# Patient Record
Sex: MALE | Race: BLACK OR AFRICAN AMERICAN | NOT HISPANIC OR LATINO | Employment: STUDENT | URBAN - METROPOLITAN AREA
[De-identification: names, ages, dates, MRNs, and addresses within clinical notes are randomized per-mention and may not be internally consistent; named-entity substitution may affect disease eponyms.]

---

## 2022-02-03 ENCOUNTER — OFFICE VISIT (OUTPATIENT)
Dept: ORTHOPEDICS | Facility: CLINIC | Age: 32
End: 2022-02-03
Payer: MEDICAID

## 2022-02-03 ENCOUNTER — HOSPITAL ENCOUNTER (OUTPATIENT)
Dept: RADIOLOGY | Facility: HOSPITAL | Age: 32
Discharge: HOME | End: 2022-02-03
Attending: PHYSICIAN ASSISTANT
Payer: MEDICAID

## 2022-02-03 ENCOUNTER — TELEPHONE (OUTPATIENT)
Dept: ORTHOPEDICS | Facility: CLINIC | Age: 32
End: 2022-02-03
Payer: MEDICAID

## 2022-02-03 VITALS
BODY MASS INDEX: 22.5 KG/M2 | RESPIRATION RATE: 16 BRPM | DIASTOLIC BLOOD PRESSURE: 74 MMHG | WEIGHT: 160.69 LBS | HEART RATE: 72 BPM | SYSTOLIC BLOOD PRESSURE: 112 MMHG | HEIGHT: 71 IN | OXYGEN SATURATION: 100 %

## 2022-02-03 DIAGNOSIS — S86.001A INJURY OF RIGHT ACHILLES TENDON, INITIAL ENCOUNTER: ICD-10-CM

## 2022-02-03 DIAGNOSIS — M25.571 ACUTE RIGHT ANKLE PAIN: ICD-10-CM

## 2022-02-03 DIAGNOSIS — M25.571 ACUTE RIGHT ANKLE PAIN: Primary | ICD-10-CM

## 2022-02-03 DIAGNOSIS — M76.60 ACHILLES TENDON PAIN: Primary | ICD-10-CM

## 2022-02-03 PROCEDURE — 99203 OFFICE O/P NEW LOW 30 MIN: CPT | Mod: S$PBB,,, | Performed by: PHYSICIAN ASSISTANT

## 2022-02-03 PROCEDURE — 3078F PR MOST RECENT DIASTOLIC BLOOD PRESSURE < 80 MM HG: ICD-10-PCS | Mod: CPTII,S$PBB,, | Performed by: PHYSICIAN ASSISTANT

## 2022-02-03 PROCEDURE — 99999 PR PBB SHADOW E&M-NEW PATIENT-LVL III: CPT | Mod: PBBFAC,,, | Performed by: PHYSICIAN ASSISTANT

## 2022-02-03 PROCEDURE — 99203 PR OFFICE/OUTPT VISIT, NEW, LEVL III, 30-44 MIN: ICD-10-PCS | Mod: S$PBB,,, | Performed by: PHYSICIAN ASSISTANT

## 2022-02-03 PROCEDURE — 73610 X-RAY EXAM OF ANKLE: CPT | Mod: 26,RT,, | Performed by: RADIOLOGY

## 2022-02-03 PROCEDURE — 73610 X-RAY EXAM OF ANKLE: CPT | Mod: TC,RT

## 2022-02-03 PROCEDURE — 99203 OFFICE O/P NEW LOW 30 MIN: CPT | Mod: PBBFAC | Performed by: PHYSICIAN ASSISTANT

## 2022-02-03 PROCEDURE — 3008F PR BODY MASS INDEX (BMI) DOCUMENTED: ICD-10-PCS | Mod: CPTII,S$PBB,, | Performed by: PHYSICIAN ASSISTANT

## 2022-02-03 PROCEDURE — 73610 XR ANKLE COMPLETE 3 VIEW RIGHT: ICD-10-PCS | Mod: 26,RT,, | Performed by: RADIOLOGY

## 2022-02-03 PROCEDURE — 3078F DIAST BP <80 MM HG: CPT | Mod: CPTII,S$PBB,, | Performed by: PHYSICIAN ASSISTANT

## 2022-02-03 PROCEDURE — 3008F BODY MASS INDEX DOCD: CPT | Mod: CPTII,S$PBB,, | Performed by: PHYSICIAN ASSISTANT

## 2022-02-03 PROCEDURE — 3074F SYST BP LT 130 MM HG: CPT | Mod: CPTII,S$PBB,, | Performed by: PHYSICIAN ASSISTANT

## 2022-02-03 PROCEDURE — 3074F PR MOST RECENT SYSTOLIC BLOOD PRESSURE < 130 MM HG: ICD-10-PCS | Mod: CPTII,S$PBB,, | Performed by: PHYSICIAN ASSISTANT

## 2022-02-03 PROCEDURE — 99999 PR PBB SHADOW E&M-NEW PATIENT-LVL III: ICD-10-PCS | Mod: PBBFAC,,, | Performed by: PHYSICIAN ASSISTANT

## 2022-02-03 NOTE — PROGRESS NOTES
Subjective:      Patient ID: Jose A Barber is a 31 y.o. male.    Chief Complaint: Pain, Swelling, and Injury of the Right Ankle    Review of patient's allergies indicates:  No Known Allergies   32 yo M presents to clinic with c/o right achilles pain.  Started 2 days ago while he was playing basketball, lunged and felt a pop to posterior ankle.  Sharp pain and swelling to area since.  Worse with ankle movement and better with rest.  Has been icing and elevating with little improvement.  No calf pain/swelling.  No numbness/tingling.        Review of Systems   Constitutional: Negative for chills, diaphoresis and fever.   HENT: Negative for congestion, ear discharge and ear pain.    Eyes: Negative for blurred vision, discharge, double vision and pain.   Cardiovascular: Negative for chest pain, claudication and cyanosis.   Respiratory: Negative for cough, hemoptysis and shortness of breath.    Endocrine: Negative for cold intolerance and heat intolerance.   Skin: Negative for color change, dry skin, itching and rash.   Musculoskeletal: Positive for joint pain and joint swelling. Negative for arthritis, back pain, falls, gout, muscle weakness and neck pain.   Gastrointestinal: Negative for abdominal pain and change in bowel habit.   Neurological: Negative for brief paralysis, disturbances in coordination, dizziness, numbness and paresthesias.   Psychiatric/Behavioral: Negative for altered mental status and depression.         Objective:          General    Constitutional: He is oriented to person, place, and time. He appears well-developed and well-nourished. No distress.   HENT:   Head: Atraumatic.   Eyes: EOM are normal. Right eye exhibits no discharge. Left eye exhibits no discharge.   Cardiovascular: Normal rate.    Pulmonary/Chest: Effort normal. No respiratory distress.   Abdominal: Soft.   Neurological: He is alert and oriented to person, place, and time.   Psychiatric: He has a normal mood and affect. His behavior  is normal.         Right Ankle/Foot Exam     Inspection   Deformity: absent  Erythema: absent  Bruising: Ankle - absent Foot - absent    Swelling   The patient is swollen on the Achilles tendon.    Tenderness   The patient is tender to palpation of the Achilles tendon.    Pain   The patient exhibits pain of the Achilles insertion and Achilles tendon.    Range of Motion   Ankle Joint   Dorsiflexion: normal   Plantar flexion: normal   Subtalar Joint   Inversion: abnormal   Eversion: abnormal   Mata Test:  positive  First MTP Joint: normal    Tests   Heel Walk: unable to perform  Tiptoe Walk: unable to perform  Single Heel Rise: unable to perform    Other   Sensation: normal    Comments:  Difficult exam due to patient discomfort    Left Ankle/Foot Exam     Inspection  Deformity: absent  Erythema: absent  Bruising: Ankle - absent Foot - absent    Range of Motion   Ankle Joint  Dorsiflexion: normal   Plantar flexion: normal     Subtalar Joint   Inversion: normal   Eversion: normal   Mata Test:  normal  First MTP Joint: normal    Tests   Heel Walk: able to perform  Tiptoe Walk: able to perform  Single Heel Rise: able to perform    Other   Sensation: normal      Vascular Exam     Right Pulses  Dorsalis Pedis:      2+          Left Pulses  Dorsalis Pedis:      2+          Edema  Right Lower Leg: absent  Left Lower Leg: absent              Assessment:         Xray Right Ankle 2/3/22:  No acute radiographic findings of the right ankle.      Encounter Diagnoses   Name Primary?    Acute right ankle pain     Achilles tendon pain Yes    Injury of right Achilles tendon, initial encounter     Achilles tendon pain  -     MRI Ankle Without Contrast Right; Future; Expected date: 02/03/2022    Acute right ankle pain    Injury of right Achilles tendon, initial encounter  -     MRI Ankle Without Contrast Right; Future; Expected date: 02/03/2022               Plan:         I made the decision to obtain old records of the  patient including previous notes and imaging. New imaging was ordered today of the extremity or extremities evaluated.     We discussed treatment options. Patient would like to proceed with MRI at this time. He would also like to try OTC ibuprofen.    1. MRI of right ankle to assess for achilles tendon rupture.  2. Ibuprofen as directed as needed.  3. Discontinue activity/sports until results.   4. Ice compress to the affected area 2-3x a day for 15-20 minutes as needed for pain management.  5. 62570 - I performed a custom orthotic / brace adjustment, fitting and training with the patient. The patient demonstrated understanding and proper care. This was performed for 15 minutes. Pt fitted with CAM boot.  6. RTC for MRI results and follow-up, sooner if needed.      Patient voices understanding of and agreement with treatment plan. All of the patient's questions were answered and the patient will contact us if he has any questions or concerns in the interim.

## 2022-02-03 NOTE — TELEPHONE ENCOUNTER
----- Message from Mira Gordon sent at 2/3/2022  2:24 PM CST -----  Regarding: Patient Advice  Contact: [phone] Pt  Patient has an appointment at 4 today for possible achilles tear. Please call if xray is needed. Please call. [phone]

## 2022-02-04 ENCOUNTER — HOSPITAL ENCOUNTER (OUTPATIENT)
Dept: RADIOLOGY | Facility: HOSPITAL | Age: 32
Discharge: HOME | End: 2022-02-04
Attending: PHYSICIAN ASSISTANT
Payer: MEDICAID

## 2022-02-04 ENCOUNTER — OFFICE VISIT (OUTPATIENT)
Dept: ORTHOPEDICS | Facility: CLINIC | Age: 32
End: 2022-02-04
Payer: MEDICAID

## 2022-02-04 ENCOUNTER — TELEPHONE (OUTPATIENT)
Dept: SPORTS MEDICINE | Facility: CLINIC | Age: 32
End: 2022-02-04
Payer: MEDICAID

## 2022-02-04 VITALS
HEART RATE: 86 BPM | HEIGHT: 71 IN | RESPIRATION RATE: 18 BRPM | SYSTOLIC BLOOD PRESSURE: 118 MMHG | OXYGEN SATURATION: 99 % | WEIGHT: 159.81 LBS | DIASTOLIC BLOOD PRESSURE: 76 MMHG | BODY MASS INDEX: 22.37 KG/M2

## 2022-02-04 DIAGNOSIS — S86.001A INJURY OF RIGHT ACHILLES TENDON, INITIAL ENCOUNTER: ICD-10-CM

## 2022-02-04 DIAGNOSIS — M76.60 ACHILLES TENDON PAIN: ICD-10-CM

## 2022-02-04 DIAGNOSIS — S86.011D RUPTURE OF RIGHT ACHILLES TENDON, SUBSEQUENT ENCOUNTER: Primary | ICD-10-CM

## 2022-02-04 PROCEDURE — 73721 MRI JNT OF LWR EXTRE W/O DYE: CPT | Mod: TC,RT

## 2022-02-04 PROCEDURE — 1160F RVW MEDS BY RX/DR IN RCRD: CPT | Mod: CPTII,,, | Performed by: PHYSICIAN ASSISTANT

## 2022-02-04 PROCEDURE — 1159F PR MEDICATION LIST DOCUMENTED IN MEDICAL RECORD: ICD-10-PCS | Mod: CPTII,,, | Performed by: PHYSICIAN ASSISTANT

## 2022-02-04 PROCEDURE — 99999 PR PBB SHADOW E&M-EST. PATIENT-LVL III: CPT | Mod: PBBFAC,,, | Performed by: PHYSICIAN ASSISTANT

## 2022-02-04 PROCEDURE — 99213 PR OFFICE/OUTPT VISIT, EST, LEVL III, 20-29 MIN: ICD-10-PCS | Mod: S$PBB,,, | Performed by: PHYSICIAN ASSISTANT

## 2022-02-04 PROCEDURE — 3078F PR MOST RECENT DIASTOLIC BLOOD PRESSURE < 80 MM HG: ICD-10-PCS | Mod: CPTII,,, | Performed by: PHYSICIAN ASSISTANT

## 2022-02-04 PROCEDURE — 99999 PR PBB SHADOW E&M-EST. PATIENT-LVL III: ICD-10-PCS | Mod: PBBFAC,,, | Performed by: PHYSICIAN ASSISTANT

## 2022-02-04 PROCEDURE — 3008F BODY MASS INDEX DOCD: CPT | Mod: CPTII,,, | Performed by: PHYSICIAN ASSISTANT

## 2022-02-04 PROCEDURE — 1160F PR REVIEW ALL MEDS BY PRESCRIBER/CLIN PHARMACIST DOCUMENTED: ICD-10-PCS | Mod: CPTII,,, | Performed by: PHYSICIAN ASSISTANT

## 2022-02-04 PROCEDURE — 3074F SYST BP LT 130 MM HG: CPT | Mod: CPTII,,, | Performed by: PHYSICIAN ASSISTANT

## 2022-02-04 PROCEDURE — 3008F PR BODY MASS INDEX (BMI) DOCUMENTED: ICD-10-PCS | Mod: CPTII,,, | Performed by: PHYSICIAN ASSISTANT

## 2022-02-04 PROCEDURE — 99213 OFFICE O/P EST LOW 20 MIN: CPT | Mod: PBBFAC,25 | Performed by: PHYSICIAN ASSISTANT

## 2022-02-04 PROCEDURE — 99213 OFFICE O/P EST LOW 20 MIN: CPT | Mod: S$PBB,,, | Performed by: PHYSICIAN ASSISTANT

## 2022-02-04 PROCEDURE — 1159F MED LIST DOCD IN RCRD: CPT | Mod: CPTII,,, | Performed by: PHYSICIAN ASSISTANT

## 2022-02-04 PROCEDURE — 3074F PR MOST RECENT SYSTOLIC BLOOD PRESSURE < 130 MM HG: ICD-10-PCS | Mod: CPTII,,, | Performed by: PHYSICIAN ASSISTANT

## 2022-02-04 PROCEDURE — 3078F DIAST BP <80 MM HG: CPT | Mod: CPTII,,, | Performed by: PHYSICIAN ASSISTANT

## 2022-02-04 PROCEDURE — 73721 MRI JNT OF LWR EXTRE W/O DYE: CPT | Mod: 26,RT,, | Performed by: RADIOLOGY

## 2022-02-04 PROCEDURE — 73721 MRI ANKLE WITHOUT CONTRAST RIGHT: ICD-10-PCS | Mod: 26,RT,, | Performed by: RADIOLOGY

## 2022-02-04 NOTE — PROGRESS NOTES
Subjective:      Patient ID: Jose A Barber is a 31 y.o. male.    Chief Complaint: Pain of the Right Ankle    Review of patient's allergies indicates:  No Known Allergies   Pt returns to clinic for follow up and MRI results.  No change in sx.  MRI this morning showed complete Achilles tendon tear.    2/3/22:  30 yo M presents to clinic with c/o right achilles pain.  Started 2 days ago while he was playing basketball, lunged and felt a pop to posterior ankle.  Sharp pain and swelling to area since.  Worse with ankle movement and better with rest.  Has been icing and elevating with little improvement.  No calf pain/swelling.  No numbness/tingling.      Pain  Associated symptoms include joint swelling. Pertinent negatives include no abdominal pain, change in bowel habit, chest pain, chills, congestion, coughing, diaphoresis, fever, neck pain, numbness or rash.       Review of Systems   Constitutional: Negative for chills, diaphoresis and fever.   HENT: Negative for congestion, ear discharge and ear pain.    Eyes: Negative for blurred vision, discharge, double vision and pain.   Cardiovascular: Negative for chest pain, claudication and cyanosis.   Respiratory: Negative for cough, hemoptysis and shortness of breath.    Endocrine: Negative for cold intolerance and heat intolerance.   Skin: Negative for color change, dry skin, itching and rash.   Musculoskeletal: Positive for joint pain and joint swelling. Negative for arthritis, back pain, falls, gout, muscle weakness and neck pain.   Gastrointestinal: Negative for abdominal pain and change in bowel habit.   Neurological: Negative for brief paralysis, disturbances in coordination, dizziness, numbness and paresthesias.   Psychiatric/Behavioral: Negative for altered mental status and depression.         Objective:          General    Constitutional: He is oriented to person, place, and time. He appears well-developed and well-nourished. No distress.   HENT:   Head:  Atraumatic.   Eyes: EOM are normal. Right eye exhibits no discharge. Left eye exhibits no discharge.   Cardiovascular: Normal rate.    Pulmonary/Chest: Effort normal. No respiratory distress.   Abdominal: Soft.   Neurological: He is alert and oriented to person, place, and time.   Psychiatric: He has a normal mood and affect. His behavior is normal.         Right Ankle/Foot Exam     Inspection   Deformity: absent  Erythema: absent  Bruising: Ankle - absent Foot - absent    Swelling   The patient is swollen on the Achilles tendon.    Tenderness   The patient is tender to palpation of the Achilles tendon.    Pain   The patient exhibits pain of the Achilles insertion and Achilles tendon.    Range of Motion   Ankle Joint   Dorsiflexion: normal   Plantar flexion: normal   Subtalar Joint   Inversion: abnormal   Eversion: abnormal   Mata Test:  positive  First MTP Joint: normal    Tests   Heel Walk: unable to perform  Tiptoe Walk: unable to perform  Single Heel Rise: unable to perform    Other   Sensation: normal    Comments:  Difficult exam due to patient discomfort    Left Ankle/Foot Exam     Inspection  Deformity: absent  Erythema: absent  Bruising: Ankle - absent Foot - absent    Range of Motion   Ankle Joint  Dorsiflexion: normal   Plantar flexion: normal     Subtalar Joint   Inversion: normal   Eversion: normal   Mata Test:  normal  First MTP Joint: normal    Tests   Heel Walk: able to perform  Tiptoe Walk: able to perform  Single Heel Rise: able to perform    Other   Sensation: normal      Vascular Exam     Right Pulses  Dorsalis Pedis:      2+          Left Pulses  Dorsalis Pedis:      2+          Edema  Right Lower Leg: absent  Left Lower Leg: absent              Assessment:         MRI Right Ankle 2/4/22:  There are abnormal changes involving the Achilles tendon with thickening and adjacent fluid and edema within the soft tissue structures.  There is a complete tear of the tendon approximately 7.4 cm  above the insertion site at the calcaneus. The proximal tendon is retracted and incompletely evaluated.  No evidence of a bone contusion or acute osseous abnormality.    Xray Right Ankle 2/3/22:  No acute radiographic findings of the right ankle.      Encounter Diagnosis   Name Primary?    Rupture of right Achilles tendon, subsequent encounter Yes    Rupture of right Achilles tendon, subsequent encounter  -     Ambulatory referral/consult to Orthopedics; Future; Expected date: 02/11/2022               Plan:         I made the decision to obtain old records of the patient including previous notes and imaging. New imaging was ordered today of the extremity or extremities evaluated.     We discussed treatment options. Patient will follow up with sports medicine to discuss surgical treatment options. He would like to be seen in [place].    1. Referral to sports medicine.  2. Ibuprofen as directed as needed.  3. Ice compress to the affected area 2-3x a day for 15-20 minutes as needed for pain management.  4. CAM boot as directed.  5. RTC as needed.      Patient voices understanding of and agreement with treatment plan. All of the patient's questions were answered and the patient will contact us if he has any questions or concerns in the interim.

## 2022-02-04 NOTE — TELEPHONE ENCOUNTER
----- Message from Maggy Malhotra MA sent at 2/4/2022 10:49 AM CST -----  Regarding: Consult  Please contact pt to schedule appt, referral placed in patients chart. Thank you.

## 2022-02-07 ENCOUNTER — OFFICE VISIT (OUTPATIENT)
Dept: SPORTS MEDICINE | Facility: CLINIC | Age: 32
End: 2022-02-07
Payer: MEDICAID

## 2022-02-07 ENCOUNTER — LAB VISIT (OUTPATIENT)
Dept: FAMILY MEDICINE | Facility: CLINIC | Age: 32
End: 2022-02-07
Payer: MEDICAID

## 2022-02-07 VITALS
HEIGHT: 71 IN | SYSTOLIC BLOOD PRESSURE: 139 MMHG | WEIGHT: 163.13 LBS | DIASTOLIC BLOOD PRESSURE: 79 MMHG | BODY MASS INDEX: 22.84 KG/M2 | HEART RATE: 62 BPM

## 2022-02-07 DIAGNOSIS — Z01.818 PRE-OP TESTING: ICD-10-CM

## 2022-02-07 DIAGNOSIS — S86.011D RUPTURE OF RIGHT ACHILLES TENDON, SUBSEQUENT ENCOUNTER: ICD-10-CM

## 2022-02-07 DIAGNOSIS — Z01.818 PRE-OP TESTING: Primary | ICD-10-CM

## 2022-02-07 DIAGNOSIS — S86.011A RUPTURE OF RIGHT ACHILLES TENDON, INITIAL ENCOUNTER: Primary | ICD-10-CM

## 2022-02-07 PROCEDURE — 99999 PR PBB SHADOW E&M-EST. PATIENT-LVL III: ICD-10-PCS | Mod: PBBFAC,,, | Performed by: STUDENT IN AN ORGANIZED HEALTH CARE EDUCATION/TRAINING PROGRAM

## 2022-02-07 PROCEDURE — 1159F MED LIST DOCD IN RCRD: CPT | Mod: CPTII,,, | Performed by: STUDENT IN AN ORGANIZED HEALTH CARE EDUCATION/TRAINING PROGRAM

## 2022-02-07 PROCEDURE — 1160F PR REVIEW ALL MEDS BY PRESCRIBER/CLIN PHARMACIST DOCUMENTED: ICD-10-PCS | Mod: CPTII,,, | Performed by: STUDENT IN AN ORGANIZED HEALTH CARE EDUCATION/TRAINING PROGRAM

## 2022-02-07 PROCEDURE — U0003 INFECTIOUS AGENT DETECTION BY NUCLEIC ACID (DNA OR RNA); SEVERE ACUTE RESPIRATORY SYNDROME CORONAVIRUS 2 (SARS-COV-2) (CORONAVIRUS DISEASE [COVID-19]), AMPLIFIED PROBE TECHNIQUE, MAKING USE OF HIGH THROUGHPUT TECHNOLOGIES AS DESCRIBED BY CMS-2020-01-R: HCPCS | Performed by: STUDENT IN AN ORGANIZED HEALTH CARE EDUCATION/TRAINING PROGRAM

## 2022-02-07 PROCEDURE — 3075F PR MOST RECENT SYSTOLIC BLOOD PRESS GE 130-139MM HG: ICD-10-PCS | Mod: CPTII,,, | Performed by: STUDENT IN AN ORGANIZED HEALTH CARE EDUCATION/TRAINING PROGRAM

## 2022-02-07 PROCEDURE — 3078F DIAST BP <80 MM HG: CPT | Mod: CPTII,,, | Performed by: STUDENT IN AN ORGANIZED HEALTH CARE EDUCATION/TRAINING PROGRAM

## 2022-02-07 PROCEDURE — 99205 PR OFFICE/OUTPT VISIT, NEW, LEVL V, 60-74 MIN: ICD-10-PCS | Mod: S$PBB,,, | Performed by: STUDENT IN AN ORGANIZED HEALTH CARE EDUCATION/TRAINING PROGRAM

## 2022-02-07 PROCEDURE — 99999 PR PBB SHADOW E&M-EST. PATIENT-LVL III: CPT | Mod: PBBFAC,,, | Performed by: STUDENT IN AN ORGANIZED HEALTH CARE EDUCATION/TRAINING PROGRAM

## 2022-02-07 PROCEDURE — 3008F PR BODY MASS INDEX (BMI) DOCUMENTED: ICD-10-PCS | Mod: CPTII,,, | Performed by: STUDENT IN AN ORGANIZED HEALTH CARE EDUCATION/TRAINING PROGRAM

## 2022-02-07 PROCEDURE — 1160F RVW MEDS BY RX/DR IN RCRD: CPT | Mod: CPTII,,, | Performed by: STUDENT IN AN ORGANIZED HEALTH CARE EDUCATION/TRAINING PROGRAM

## 2022-02-07 PROCEDURE — 3078F PR MOST RECENT DIASTOLIC BLOOD PRESSURE < 80 MM HG: ICD-10-PCS | Mod: CPTII,,, | Performed by: STUDENT IN AN ORGANIZED HEALTH CARE EDUCATION/TRAINING PROGRAM

## 2022-02-07 PROCEDURE — U0005 INFEC AGEN DETEC AMPLI PROBE: HCPCS | Performed by: STUDENT IN AN ORGANIZED HEALTH CARE EDUCATION/TRAINING PROGRAM

## 2022-02-07 PROCEDURE — 3008F BODY MASS INDEX DOCD: CPT | Mod: CPTII,,, | Performed by: STUDENT IN AN ORGANIZED HEALTH CARE EDUCATION/TRAINING PROGRAM

## 2022-02-07 PROCEDURE — 99205 OFFICE O/P NEW HI 60 MIN: CPT | Mod: S$PBB,,, | Performed by: STUDENT IN AN ORGANIZED HEALTH CARE EDUCATION/TRAINING PROGRAM

## 2022-02-07 PROCEDURE — 3075F SYST BP GE 130 - 139MM HG: CPT | Mod: CPTII,,, | Performed by: STUDENT IN AN ORGANIZED HEALTH CARE EDUCATION/TRAINING PROGRAM

## 2022-02-07 PROCEDURE — 1159F PR MEDICATION LIST DOCUMENTED IN MEDICAL RECORD: ICD-10-PCS | Mod: CPTII,,, | Performed by: STUDENT IN AN ORGANIZED HEALTH CARE EDUCATION/TRAINING PROGRAM

## 2022-02-07 PROCEDURE — 99213 OFFICE O/P EST LOW 20 MIN: CPT | Mod: PBBFAC,PN | Performed by: STUDENT IN AN ORGANIZED HEALTH CARE EDUCATION/TRAINING PROGRAM

## 2022-02-07 RX ORDER — PROMETHAZINE HYDROCHLORIDE 25 MG/1
25 TABLET ORAL EVERY 4 HOURS
Qty: 31 TABLET | Refills: 0 | Status: SHIPPED | OUTPATIENT
Start: 2022-02-07

## 2022-02-07 RX ORDER — METHOCARBAMOL 500 MG/1
500 TABLET, FILM COATED ORAL 4 TIMES DAILY
Qty: 120 TABLET | Refills: 0 | Status: SHIPPED | OUTPATIENT
Start: 2022-02-07 | End: 2022-03-11

## 2022-02-07 RX ORDER — CELECOXIB 200 MG/1
200 CAPSULE ORAL 2 TIMES DAILY
Qty: 60 CAPSULE | Refills: 0 | Status: SHIPPED | OUTPATIENT
Start: 2022-02-07 | End: 2022-03-11

## 2022-02-07 RX ORDER — OXYCODONE HYDROCHLORIDE 5 MG/1
5 TABLET ORAL EVERY 4 HOURS PRN
Qty: 31 TABLET | Refills: 0 | Status: SHIPPED | OUTPATIENT
Start: 2022-02-07

## 2022-02-07 RX ORDER — MUPIROCIN 20 MG/G
OINTMENT TOPICAL
Status: CANCELLED | OUTPATIENT
Start: 2022-02-07

## 2022-02-07 RX ORDER — ASPIRIN 81 MG/1
81 TABLET ORAL 2 TIMES DAILY
Qty: 60 TABLET | Refills: 0 | Status: SHIPPED | OUTPATIENT
Start: 2022-02-07 | End: 2022-03-11

## 2022-02-07 NOTE — H&P (VIEW-ONLY)
Subjective:          Chief Complaint: Jose A Barber is a 31 y.o. male who had concerns including Pain of the Right Foot.    Jose A Barber is a 31 y.o. male  presents for evaluation of his right foot Achilles.  He was playing basketball 6 days ago when he felt a pop of his right Achilles.  He was evaluated by Luciana Pool PA-C where x-rays and an MRI were obtained demonstrating an Achilles tendon rupture.  He is here today to discuss treatment options.  He has been mobilizing with crutches and has not been putting weight on his foot.  He says overall his pain is okay, he rates it a 4/10.  He was issued a boot but does not wear when he is out mobilizing as he does not put weight on his foot.  Denies any numbness or paresthesias.  Denies any recent antibiotic or steroid use.    Pain  Pertinent negatives include no joint swelling or myalgias.     Past Medical History:   Diagnosis Date    Hx of vasectomy        No current outpatient medications on file prior to visit.     No current facility-administered medications on file prior to visit.       History reviewed. No pertinent surgical history.    History reviewed. No pertinent family history.    Social History     Socioeconomic History    Marital status: Single       Review of Systems   Constitutional: Negative.   HENT: Negative.    Eyes: Negative.    Cardiovascular: Negative.    Respiratory: Negative.    Endocrine: Negative.    Hematologic/Lymphatic: Negative.    Skin: Negative.    Musculoskeletal: Positive for joint pain (Right ankle) and muscle weakness ( right calf). Negative for arthritis, falls, joint swelling, myalgias and stiffness.   Neurological: Negative.    Psychiatric/Behavioral: Negative.    Allergic/Immunologic: Negative.                    Objective:        General: Jose A is well-developed, well-nourished, appears stated age, in no acute distress, alert and oriented to time, place and person.     General    Nursing note and vitals  reviewed.  Constitutional: He is oriented to person, place, and time. He appears well-developed and well-nourished.   HENT:   Head: Normocephalic and atraumatic.   Nose: Nose normal.   Eyes: EOM are normal.   Cardiovascular: Normal rate and intact distal pulses.    Pulmonary/Chest: Effort normal. No respiratory distress.   Neurological: He is alert and oriented to person, place, and time.   Psychiatric: He has a normal mood and affect. His behavior is normal. Judgment and thought content normal.     General Musculoskeletal Exam   Gait: abnormal     Right Ankle/Foot Exam     Inspection   Scars: absent  Deformity: present (Palpable defect in Achilles tendon several cm proximal the calcaneal insertion)  Erythema: absent  Bruising: Ankle - absent Foot - absent  Effusion: Ankle - absent Foot - absent  Atrophy: Ankle - absent Foot - absent    Range of Motion   Ankle Joint   Dorsiflexion: 10   Plantar flexion: 5   Subtalar Joint   Inversion: 10   Eversion: 20   Mata Test:  positive      Imaging:  MRI of the right ankle from 02/03/2022 personally reviewed by me on 02/07/2022.  There was complete disruption of the Achilles tendon about 7 cm proximal to the insertion in the calcaneus.  No other abnormalities.            Assessment:     Jose A Barber is a 31 y.o. male with right Achilles tendon rupture  Encounter Diagnosis   Name Primary?    Rupture of right Achilles tendon, initial encounter Yes          Plan:         The diagnosis and treatment options were explained at length to the patient all his questions were answered.  I showed him the MRI and I explained the findings to him.  We discussed non operative and operative treatment.  Nonoperative treatment would include time and a stacked boot or cast with gradual progression to mobility and strengthening.  We then discussed operative intervention which would include Achilles tendon repair.  After this discussion, he elected to proceed with Achilles tendon repair.  We  will plan on this on 02/09/2022.  He does not need preoperative clearance.  We will use aspirin for DVT prophylaxis.      The risks, benefits and alternatives to surgery were discussed with the patient at great length.  These include bleeding, infection, vessel/nerve damage, pain, numbness, tingling, complex regional pain syndrome, hardware/surgical failure, need for further surgery, repair failure, tendon retear, cosmetic deformity, failure of repair of other structures, damage to surrounding neurovascular structures, persistent instability, weakness, DVT, PE, arthritis and death.  Patient states an understanding and wishes to proceed with surgery.   All questions were answered.  No guarantees were implied or stated.      All of their questions were answered.  They will call the clinic with any questions or concerns in the interim.    Should the patient's symptoms worsen, persist, or fail to improve they should return for reevaluation and I would be happy to see them back anytime.        Toribio Allan M.D.     Please be aware that this note has been generated with the assistance of [employer] voice-to-text.  Please excuse any spelling or grammatical errors.    Thank you for choosing Dr. Toribio Allan for your sports medicine care. It is our goal to provide you with exceptional care that will help keep you healthy, active, and get you back in the game.     If you felt that you received exemplary care today, please consider leaving feedback for Dr. Allan on [employer] at [email or web address]    Please do not hesitate to reach out to us via email, phone, or MyChart with any questions, concerns, or feedback.

## 2022-02-07 NOTE — H&P
Subjective:          Chief Complaint: Jose A Barber is a 31 y.o. male who had concerns including Pain of the Right Foot.    Jose A Barber is a 31 y.o. male  presents for evaluation of his right foot Achilles.  He was playing basketball 6 days ago when he felt a pop of his right Achilles.  He was evaluated by Luciana Pool PA-C where x-rays and an MRI were obtained demonstrating an Achilles tendon rupture.  He is here today to discuss treatment options.  He has been mobilizing with crutches and has not been putting weight on his foot.  He says overall his pain is okay, he rates it a 4/10.  He was issued a boot but does not wear when he is out mobilizing as he does not put weight on his foot.  Denies any numbness or paresthesias.  Denies any recent antibiotic or steroid use.    Pain  Pertinent negatives include no joint swelling or myalgias.     Past Medical History:   Diagnosis Date    Hx of vasectomy        No current outpatient medications on file prior to visit.     No current facility-administered medications on file prior to visit.       History reviewed. No pertinent surgical history.    History reviewed. No pertinent family history.    Social History     Socioeconomic History    Marital status: Single       Review of Systems   Constitutional: Negative.   HENT: Negative.    Eyes: Negative.    Cardiovascular: Negative.    Respiratory: Negative.    Endocrine: Negative.    Hematologic/Lymphatic: Negative.    Skin: Negative.    Musculoskeletal: Positive for joint pain (Right ankle) and muscle weakness ( right calf). Negative for arthritis, falls, joint swelling, myalgias and stiffness.   Neurological: Negative.    Psychiatric/Behavioral: Negative.    Allergic/Immunologic: Negative.                    Objective:        General: Jose A is well-developed, well-nourished, appears stated age, in no acute distress, alert and oriented to time, place and person.     General    Nursing note and vitals  reviewed.  Constitutional: He is oriented to person, place, and time. He appears well-developed and well-nourished.   HENT:   Head: Normocephalic and atraumatic.   Nose: Nose normal.   Eyes: EOM are normal.   Cardiovascular: Normal rate and intact distal pulses.    Pulmonary/Chest: Effort normal. No respiratory distress.   Neurological: He is alert and oriented to person, place, and time.   Psychiatric: He has a normal mood and affect. His behavior is normal. Judgment and thought content normal.     General Musculoskeletal Exam   Gait: abnormal     Right Ankle/Foot Exam     Inspection   Scars: absent  Deformity: present (Palpable defect in Achilles tendon several cm proximal the calcaneal insertion)  Erythema: absent  Bruising: Ankle - absent Foot - absent  Effusion: Ankle - absent Foot - absent  Atrophy: Ankle - absent Foot - absent    Range of Motion   Ankle Joint   Dorsiflexion: 10   Plantar flexion: 5   Subtalar Joint   Inversion: 10   Eversion: 20   Mata Test:  positive      Imaging:  MRI of the right ankle from 02/03/2022 personally reviewed by me on 02/07/2022.  There was complete disruption of the Achilles tendon about 7 cm proximal to the insertion in the calcaneus.  No other abnormalities.            Assessment:     Jose A Barber is a 31 y.o. male with right Achilles tendon rupture  Encounter Diagnosis   Name Primary?    Rupture of right Achilles tendon, initial encounter Yes          Plan:         The diagnosis and treatment options were explained at length to the patient all his questions were answered.  I showed him the MRI and I explained the findings to him.  We discussed non operative and operative treatment.  Nonoperative treatment would include time and a stacked boot or cast with gradual progression to mobility and strengthening.  We then discussed operative intervention which would include Achilles tendon repair.  After this discussion, he elected to proceed with Achilles tendon repair.  We  will plan on this on 02/09/2022.  He does not need preoperative clearance.  We will use aspirin for DVT prophylaxis.      The risks, benefits and alternatives to surgery were discussed with the patient at great length.  These include bleeding, infection, vessel/nerve damage, pain, numbness, tingling, complex regional pain syndrome, hardware/surgical failure, need for further surgery, repair failure, tendon retear, cosmetic deformity, failure of repair of other structures, damage to surrounding neurovascular structures, persistent instability, weakness, DVT, PE, arthritis and death.  Patient states an understanding and wishes to proceed with surgery.   All questions were answered.  No guarantees were implied or stated.      All of their questions were answered.  They will call the clinic with any questions or concerns in the interim.    Should the patient's symptoms worsen, persist, or fail to improve they should return for reevaluation and I would be happy to see them back anytime.        Toribio Allan M.D.     Please be aware that this note has been generated with the assistance of [employer] voice-to-text.  Please excuse any spelling or grammatical errors.    Thank you for choosing Dr. Toribio Allan for your sports medicine care. It is our goal to provide you with exceptional care that will help keep you healthy, active, and get you back in the game.     If you felt that you received exemplary care today, please consider leaving feedback for Dr. Allan on [email or web address] at [email or web address].    Please do not hesitate to reach out to us via email, phone, or MyChart with any questions, concerns, or feedback.

## 2022-02-07 NOTE — ANESTHESIA PAT ROS NOTE
02/07/2022  Jose A Barber is a 31 y.o., male.      Pre-op Assessment    I have reviewed the Patient Summary Reports.     I have reviewed the Nursing Notes. I have reviewed the NPO Status.   I have reviewed the Medications.     Review of Systems  Anesthesia Hx:  No problems with previous Anesthesia  Denies Family Hx of Anesthesia complications.   Denies Personal Hx of Anesthesia complications.   Social:  Non-Smoker, No Alcohol Use    Hematology/Oncology:  Hematology Normal        EENT/Dental:EENT/Dental Normal   Cardiovascular:   Exercise tolerance: good    Pulmonary:  Pulmonary Normal    Renal/:  Renal/ Normal     Hepatic/GI:  Hepatic/GI Normal    Neurological:  Neurology Normal    Endocrine:  Endocrine Normal    Dermatological:  Skin Normal    Psych:  Psychiatric Normal              Anesthesia Assessment: Preoperative EQUATION    Planned Procedure: Procedure(s) (LRB):  REPAIR, TENDON, ACHILLES (Right)  Requested Anesthesia Type:General  Surgeon: Toribio Allan MD  Service: Orthopedics  Known or anticipated Date of Surgery:2/9/2022    Surgeon notes: reviewed  No past surgical history on file.    Electronic QUestionnaire Assessment completed via nurse interview with patient.      Past Medical History:   Diagnosis Date    Hx of vasectomy        Triage considerations:     The patient has no apparent active cardiac condition (No unstable coronary Syndrome such as severe unstable angina or recent [<1 month] myocardial infarction, decompensated CHF, severe valvular   disease or significant arrhythmia)    Instructions given. (See in Nurse's note)    Optimization:  Anesthesia Preop Clinic Assessment  Indicated    Medical Opinion Indicated       Sub-specialist consult indicated:   TBD      Navigation: Tests Scheduled.              Consults scheduled.             Results will be tracked by Preop  "Clinic.    Height: 5' 11" (180.3 cm)  as of 2/7/2022 5' 11" (180.3 cm) 5' 11" (180.3 cm)    Weight: 74 kg (163 lb 2.3 oz)  as of 2/7/2022 74 kg (163 lb 2.3 oz) 72.5 kg (159 lb 13.3 oz)          Review of patient's allergies indicates:  No Known Allergies     NON VACCINATED for COVID                 "

## 2022-02-07 NOTE — PROGRESS NOTES
Subjective:          Chief Complaint: Jose A Barber is a 31 y.o. male who had concerns including Pain of the Right Foot.    Jose A Barber is a 31 y.o. male  presents for evaluation of his right foot Achilles.  He was playing basketball 6 days ago when he felt a pop of his right Achilles.  He was evaluated by Luciana Pool PA-C where x-rays and an MRI were obtained demonstrating an Achilles tendon rupture.  He is here today to discuss treatment options.  He has been mobilizing with crutches and has not been putting weight on his foot.  He says overall his pain is okay, he rates it a 4/10.  He was issued a boot but does not wear when he is out mobilizing as he does not put weight on his foot.  Denies any numbness or paresthesias.  Denies any recent antibiotic or steroid use.    Past Medical History:   Diagnosis Date    Hx of vasectomy        No current outpatient medications on file prior to visit.     No current facility-administered medications on file prior to visit.       No past surgical history on file.    No family history on file.    Social History     Socioeconomic History    Marital status: Single       Review of Systems   Constitutional: Negative.   HENT: Negative.    Eyes: Negative.    Cardiovascular: Negative.    Respiratory: Negative.    Endocrine: Negative.    Hematologic/Lymphatic: Negative.    Skin: Negative.    Musculoskeletal: Positive for joint pain (Right ankle) and muscle weakness ( right calf). Negative for arthritis, falls, joint swelling, myalgias and stiffness.   Neurological: Negative.    Psychiatric/Behavioral: Negative.    Allergic/Immunologic: Negative.                    Objective:        General: Jose A is well-developed, well-nourished, appears stated age, in no acute distress, alert and oriented to time, place and person.     General    Nursing note and vitals reviewed.  Constitutional: He is oriented to person, place, and time. He appears well-developed and  well-nourished.   HENT:   Head: Normocephalic and atraumatic.   Nose: Nose normal.   Eyes: EOM are normal.   Cardiovascular: Normal rate and intact distal pulses.    Pulmonary/Chest: Effort normal. No respiratory distress.   Neurological: He is alert and oriented to person, place, and time.   Psychiatric: He has a normal mood and affect. His behavior is normal. Judgment and thought content normal.     General Musculoskeletal Exam   Gait: abnormal     Right Ankle/Foot Exam     Inspection   Scars: absent  Deformity: present (Palpable defect in Achilles tendon several cm proximal the calcaneal insertion)  Erythema: absent  Bruising: Ankle - absent Foot - absent  Effusion: Ankle - absent Foot - absent  Atrophy: Ankle - absent Foot - absent    Range of Motion   Ankle Joint   Dorsiflexion: 10   Plantar flexion: 5   Subtalar Joint   Inversion: 10   Eversion: 20   Mata Test:  positive      Imaging:  MRI of the right ankle from 02/03/2022 personally reviewed by me on 02/07/2022.  There was complete disruption of the Achilles tendon about 7 cm proximal to the insertion in the calcaneus.  No other abnormalities.            Assessment:     Jose A Barber is a 31 y.o. male with right Achilles tendon rupture  Encounter Diagnosis   Name Primary?    Rupture of right Achilles tendon, initial encounter Yes          Plan:         The diagnosis and treatment options were explained at length to the patient all his questions were answered.  I showed him the MRI and I explained the findings to him.  We discussed non operative and operative treatment.  Nonoperative treatment would include time and a stacked boot or cast with gradual progression to mobility and strengthening.  We then discussed operative intervention which would include Achilles tendon repair.  After this discussion, he elected to proceed with Achilles tendon repair.  We will plan on this on 02/09/2022.  He does not need preoperative clearance.  We will use aspirin for  DVT prophylaxis.      The risks, benefits and alternatives to surgery were discussed with the patient at great length.  These include bleeding, infection, vessel/nerve damage, pain, numbness, tingling, complex regional pain syndrome, hardware/surgical failure, need for further surgery, repair failure, tendon retear, cosmetic deformity, failure of repair of other structures, damage to surrounding neurovascular structures, persistent instability, weakness, DVT, PE, arthritis and death.  Patient states an understanding and wishes to proceed with surgery.   All questions were answered.  No guarantees were implied or stated.      All of their questions were answered.  They will call the clinic with any questions or concerns in the interim.    Should the patient's symptoms worsen, persist, or fail to improve they should return for reevaluation and I would be happy to see them back anytime.        Toribio Allan M.D.     Please be aware that this note has been generated with the assistance of [employer] voice-to-text.  Please excuse any spelling or grammatical errors.    Thank you for choosing Dr. Toribio Allan for your sports medicine care. It is our goal to provide you with exceptional care that will help keep you healthy, active, and get you back in the game.     If you felt that you received exemplary care today, please consider leaving feedback for Dr. Allan on [email or web address] at [email or web address].    Please do not hesitate to reach out to us via email, phone, or MyChart with any questions, concerns, or feedback.

## 2022-02-08 ENCOUNTER — ANESTHESIA EVENT (OUTPATIENT)
Dept: SURGERY | Facility: HOSPITAL | Age: 32
End: 2022-02-08
Payer: MEDICAID

## 2022-02-08 ENCOUNTER — TELEPHONE (OUTPATIENT)
Dept: PHARMACY | Facility: CLINIC | Age: 32
End: 2022-02-08
Payer: MEDICAID

## 2022-02-08 ENCOUNTER — TELEPHONE (OUTPATIENT)
Dept: SPORTS MEDICINE | Facility: CLINIC | Age: 32
End: 2022-02-08
Payer: MEDICAID

## 2022-02-08 LAB
SARS-COV-2 RNA RESP QL NAA+PROBE: NOT DETECTED
SARS-COV-2- CYCLE NUMBER: NORMAL

## 2022-02-08 NOTE — TELEPHONE ENCOUNTER
Spoke with patient in notification of arrival time for surgery on 2/9/22. Patient was instructed to arrive at 10:30am to [facility] at the [facility]. He understood.      Andrew Hendrickson MS, OT  Sports Medicine Assistant  [facility]

## 2022-02-09 ENCOUNTER — ANESTHESIA (OUTPATIENT)
Dept: SURGERY | Facility: HOSPITAL | Age: 32
End: 2022-02-09
Payer: MEDICAID

## 2022-02-09 ENCOUNTER — NURSE TRIAGE (OUTPATIENT)
Dept: ADMINISTRATIVE | Facility: CLINIC | Age: 32
End: 2022-02-09
Payer: MEDICAID

## 2022-02-09 ENCOUNTER — HOSPITAL ENCOUNTER (OUTPATIENT)
Facility: HOSPITAL | Age: 32
Discharge: HOME | End: 2022-02-09
Attending: STUDENT IN AN ORGANIZED HEALTH CARE EDUCATION/TRAINING PROGRAM | Admitting: STUDENT IN AN ORGANIZED HEALTH CARE EDUCATION/TRAINING PROGRAM
Payer: MEDICAID

## 2022-02-09 VITALS
RESPIRATION RATE: 11 BRPM | OXYGEN SATURATION: 100 % | SYSTOLIC BLOOD PRESSURE: 125 MMHG | DIASTOLIC BLOOD PRESSURE: 77 MMHG | BODY MASS INDEX: 22.82 KG/M2 | TEMPERATURE: 98 F | WEIGHT: 163 LBS | HEIGHT: 71 IN | HEART RATE: 54 BPM

## 2022-02-09 DIAGNOSIS — S86.011A RUPTURE OF RIGHT ACHILLES TENDON, INITIAL ENCOUNTER: Primary | ICD-10-CM

## 2022-02-09 PROCEDURE — 64447 NJX AA&/STRD FEMORAL NRV IMG: CPT | Performed by: STUDENT IN AN ORGANIZED HEALTH CARE EDUCATION/TRAINING PROGRAM

## 2022-02-09 PROCEDURE — 76942 PR U/S GUIDANCE FOR NEEDLE GUIDANCE: ICD-10-PCS | Mod: 26,,, | Performed by: ANESTHESIOLOGY

## 2022-02-09 PROCEDURE — 64445 NJX AA&/STRD SCIATIC NRV IMG: CPT | Mod: 59,RT,, | Performed by: ANESTHESIOLOGY

## 2022-02-09 PROCEDURE — D9220A PRA ANESTHESIA: ICD-10-PCS | Mod: CRNA,,, | Performed by: NURSE ANESTHETIST, CERTIFIED REGISTERED

## 2022-02-09 PROCEDURE — 71000015 HC POSTOP RECOV 1ST HR: Performed by: STUDENT IN AN ORGANIZED HEALTH CARE EDUCATION/TRAINING PROGRAM

## 2022-02-09 PROCEDURE — 25000003 PHARM REV CODE 250: Performed by: NURSE ANESTHETIST, CERTIFIED REGISTERED

## 2022-02-09 PROCEDURE — 64447 PR NERVE BLOCK INJ, ANES/STEROID, FEMORAL, INCL IMAG GUIDANCE: ICD-10-PCS | Mod: 59,RT,, | Performed by: ANESTHESIOLOGY

## 2022-02-09 PROCEDURE — 01472 ANES RPR RPT ACHILLES TENDON: CPT | Performed by: STUDENT IN AN ORGANIZED HEALTH CARE EDUCATION/TRAINING PROGRAM

## 2022-02-09 PROCEDURE — 27650 PR REPAIR ACHILLES TENDON,PRIMARY: ICD-10-PCS | Mod: RT,,, | Performed by: STUDENT IN AN ORGANIZED HEALTH CARE EDUCATION/TRAINING PROGRAM

## 2022-02-09 PROCEDURE — 27650 REPAIR ACHILLES TENDON: CPT | Mod: RT,,, | Performed by: STUDENT IN AN ORGANIZED HEALTH CARE EDUCATION/TRAINING PROGRAM

## 2022-02-09 PROCEDURE — 71000033 HC RECOVERY, INTIAL HOUR: Performed by: STUDENT IN AN ORGANIZED HEALTH CARE EDUCATION/TRAINING PROGRAM

## 2022-02-09 PROCEDURE — D9220A PRA ANESTHESIA: Mod: CRNA,,, | Performed by: NURSE ANESTHETIST, CERTIFIED REGISTERED

## 2022-02-09 PROCEDURE — 94761 N-INVAS EAR/PLS OXIMETRY MLT: CPT

## 2022-02-09 PROCEDURE — 25000003 PHARM REV CODE 250: Performed by: ANESTHESIOLOGY

## 2022-02-09 PROCEDURE — 76942 ECHO GUIDE FOR BIOPSY: CPT | Performed by: STUDENT IN AN ORGANIZED HEALTH CARE EDUCATION/TRAINING PROGRAM

## 2022-02-09 PROCEDURE — 25000003 PHARM REV CODE 250: Performed by: STUDENT IN AN ORGANIZED HEALTH CARE EDUCATION/TRAINING PROGRAM

## 2022-02-09 PROCEDURE — 37000009 HC ANESTHESIA EA ADD 15 MINS: Performed by: STUDENT IN AN ORGANIZED HEALTH CARE EDUCATION/TRAINING PROGRAM

## 2022-02-09 PROCEDURE — D9220A PRA ANESTHESIA: ICD-10-PCS | Mod: ANES,,, | Performed by: ANESTHESIOLOGY

## 2022-02-09 PROCEDURE — 37000008 HC ANESTHESIA 1ST 15 MINUTES: Performed by: STUDENT IN AN ORGANIZED HEALTH CARE EDUCATION/TRAINING PROGRAM

## 2022-02-09 PROCEDURE — 27100025 HC TUBING, SET FLUID WARMER: Performed by: ANESTHESIOLOGY

## 2022-02-09 PROCEDURE — 36000708 HC OR TIME LEV III 1ST 15 MIN: Performed by: STUDENT IN AN ORGANIZED HEALTH CARE EDUCATION/TRAINING PROGRAM

## 2022-02-09 PROCEDURE — 63600175 PHARM REV CODE 636 W HCPCS: Performed by: STUDENT IN AN ORGANIZED HEALTH CARE EDUCATION/TRAINING PROGRAM

## 2022-02-09 PROCEDURE — 27650 REPAIR ACHILLES TENDON: CPT | Mod: AS,RT,, | Performed by: ORTHOPAEDIC SURGERY

## 2022-02-09 PROCEDURE — 63600175 PHARM REV CODE 636 W HCPCS: Performed by: NURSE ANESTHETIST, CERTIFIED REGISTERED

## 2022-02-09 PROCEDURE — D9220A PRA ANESTHESIA: Mod: ANES,,, | Performed by: ANESTHESIOLOGY

## 2022-02-09 PROCEDURE — 36000709 HC OR TIME LEV III EA ADD 15 MIN: Performed by: STUDENT IN AN ORGANIZED HEALTH CARE EDUCATION/TRAINING PROGRAM

## 2022-02-09 PROCEDURE — 99900035 HC TECH TIME PER 15 MIN (STAT)

## 2022-02-09 PROCEDURE — 64447 NJX AA&/STRD FEMORAL NRV IMG: CPT | Mod: 59,RT,, | Performed by: ANESTHESIOLOGY

## 2022-02-09 PROCEDURE — 27650 PR REPAIR ACHILLES TENDON,PRIMARY: ICD-10-PCS | Mod: AS,RT,, | Performed by: ORTHOPAEDIC SURGERY

## 2022-02-09 PROCEDURE — 64445 PR NERVE BLOCK INJ, ANES/STEROID, SCIATIC, INCL IMAG GUIDANCE: ICD-10-PCS | Mod: 59,RT,, | Performed by: ANESTHESIOLOGY

## 2022-02-09 PROCEDURE — 76942 ECHO GUIDE FOR BIOPSY: CPT | Mod: 26,,, | Performed by: ANESTHESIOLOGY

## 2022-02-09 RX ORDER — BUPIVACAINE HYDROCHLORIDE AND EPINEPHRINE 5; 5 MG/ML; UG/ML
INJECTION, SOLUTION EPIDURAL; INTRACAUDAL; PERINEURAL
Status: COMPLETED | OUTPATIENT
Start: 2022-02-09 | End: 2022-02-09

## 2022-02-09 RX ORDER — FENTANYL CITRATE 50 UG/ML
25 INJECTION, SOLUTION INTRAMUSCULAR; INTRAVENOUS EVERY 5 MIN PRN
Status: DISCONTINUED | OUTPATIENT
Start: 2022-02-09 | End: 2022-02-09 | Stop reason: HOSPADM

## 2022-02-09 RX ORDER — HYDROCODONE BITARTRATE AND ACETAMINOPHEN 5; 325 MG/1; MG/1
1 TABLET ORAL EVERY 4 HOURS PRN
Status: DISCONTINUED | OUTPATIENT
Start: 2022-02-09 | End: 2022-02-09 | Stop reason: HOSPADM

## 2022-02-09 RX ORDER — CEFAZOLIN SODIUM/WATER 2 G/20 ML
2 SYRINGE (ML) INTRAVENOUS
Status: COMPLETED | OUTPATIENT
Start: 2022-02-09 | End: 2022-02-09

## 2022-02-09 RX ORDER — ROCURONIUM BROMIDE 10 MG/ML
INJECTION, SOLUTION INTRAVENOUS
Status: DISCONTINUED | OUTPATIENT
Start: 2022-02-09 | End: 2022-02-09

## 2022-02-09 RX ORDER — CELECOXIB 200 MG/1
400 CAPSULE ORAL
Status: COMPLETED | OUTPATIENT
Start: 2022-02-09 | End: 2022-02-09

## 2022-02-09 RX ORDER — OXYCODONE HYDROCHLORIDE 5 MG/1
10 TABLET ORAL EVERY 4 HOURS PRN
Status: DISCONTINUED | OUTPATIENT
Start: 2022-02-09 | End: 2022-02-09 | Stop reason: HOSPADM

## 2022-02-09 RX ORDER — ONDANSETRON 2 MG/ML
4 INJECTION INTRAMUSCULAR; INTRAVENOUS EVERY 12 HOURS PRN
Status: DISCONTINUED | OUTPATIENT
Start: 2022-02-09 | End: 2022-02-09 | Stop reason: HOSPADM

## 2022-02-09 RX ORDER — MIDAZOLAM HYDROCHLORIDE 1 MG/ML
0.5 INJECTION INTRAMUSCULAR; INTRAVENOUS
Status: DISCONTINUED | OUTPATIENT
Start: 2022-02-09 | End: 2022-02-09 | Stop reason: HOSPADM

## 2022-02-09 RX ORDER — DEXAMETHASONE SODIUM PHOSPHATE 4 MG/ML
INJECTION, SOLUTION INTRA-ARTICULAR; INTRALESIONAL; INTRAMUSCULAR; INTRAVENOUS; SOFT TISSUE
Status: DISCONTINUED | OUTPATIENT
Start: 2022-02-09 | End: 2022-02-09

## 2022-02-09 RX ORDER — LIDOCAINE HYDROCHLORIDE 20 MG/ML
INJECTION INTRAVENOUS
Status: DISCONTINUED | OUTPATIENT
Start: 2022-02-09 | End: 2022-02-09

## 2022-02-09 RX ORDER — MUPIROCIN 20 MG/G
OINTMENT TOPICAL
Status: DISCONTINUED | OUTPATIENT
Start: 2022-02-09 | End: 2022-02-09 | Stop reason: HOSPADM

## 2022-02-09 RX ORDER — SODIUM CHLORIDE 0.9 % (FLUSH) 0.9 %
10 SYRINGE (ML) INJECTION
Status: DISCONTINUED | OUTPATIENT
Start: 2022-02-09 | End: 2022-02-09 | Stop reason: HOSPADM

## 2022-02-09 RX ORDER — KETAMINE HCL IN 0.9 % NACL 50 MG/5 ML
SYRINGE (ML) INTRAVENOUS
Status: DISCONTINUED | OUTPATIENT
Start: 2022-02-09 | End: 2022-02-09

## 2022-02-09 RX ORDER — ACETAMINOPHEN 500 MG
1000 TABLET ORAL
Status: COMPLETED | OUTPATIENT
Start: 2022-02-09 | End: 2022-02-09

## 2022-02-09 RX ORDER — FENTANYL CITRATE 50 UG/ML
INJECTION, SOLUTION INTRAMUSCULAR; INTRAVENOUS
Status: DISCONTINUED | OUTPATIENT
Start: 2022-02-09 | End: 2022-02-09

## 2022-02-09 RX ORDER — METOCLOPRAMIDE HYDROCHLORIDE 5 MG/ML
5 INJECTION INTRAMUSCULAR; INTRAVENOUS EVERY 6 HOURS PRN
Status: DISCONTINUED | OUTPATIENT
Start: 2022-02-09 | End: 2022-02-09 | Stop reason: HOSPADM

## 2022-02-09 RX ORDER — ACETAMINOPHEN 325 MG/1
650 TABLET ORAL EVERY 4 HOURS PRN
Status: DISCONTINUED | OUTPATIENT
Start: 2022-02-09 | End: 2022-02-09 | Stop reason: HOSPADM

## 2022-02-09 RX ORDER — PROPOFOL 10 MG/ML
VIAL (ML) INTRAVENOUS
Status: DISCONTINUED | OUTPATIENT
Start: 2022-02-09 | End: 2022-02-09

## 2022-02-09 RX ORDER — FAMOTIDINE 10 MG/ML
INJECTION INTRAVENOUS
Status: DISCONTINUED | OUTPATIENT
Start: 2022-02-09 | End: 2022-02-09

## 2022-02-09 RX ORDER — BUPIVACAINE HYDROCHLORIDE 5 MG/ML
INJECTION, SOLUTION EPIDURAL; INTRACAUDAL
Status: DISCONTINUED
Start: 2022-02-09 | End: 2022-02-09 | Stop reason: HOSPADM

## 2022-02-09 RX ADMIN — SODIUM CHLORIDE, SODIUM GLUCONATE, SODIUM ACETATE, POTASSIUM CHLORIDE, MAGNESIUM CHLORIDE, SODIUM PHOSPHATE, DIBASIC, AND POTASSIUM PHOSPHATE: .53; .5; .37; .037; .03; .012; .00082 INJECTION, SOLUTION INTRAVENOUS at 12:02

## 2022-02-09 RX ADMIN — Medication 2 G: at 12:02

## 2022-02-09 RX ADMIN — FAMOTIDINE 20 MG: 10 INJECTION, SOLUTION INTRAVENOUS at 12:02

## 2022-02-09 RX ADMIN — BUPIVACAINE HYDROCHLORIDE AND EPINEPHRINE BITARTRATE 30 ML: 5; .0091 INJECTION, SOLUTION EPIDURAL; INTRACAUDAL; PERINEURAL at 11:02

## 2022-02-09 RX ADMIN — FENTANYL CITRATE 100 MCG: 50 INJECTION INTRAMUSCULAR; INTRAVENOUS at 11:02

## 2022-02-09 RX ADMIN — SODIUM CHLORIDE: 0.9 INJECTION, SOLUTION INTRAVENOUS at 11:02

## 2022-02-09 RX ADMIN — MUPIROCIN: 20 OINTMENT TOPICAL at 11:02

## 2022-02-09 RX ADMIN — ESMOLOL HYDROCHLORIDE 30 MG: 10 INJECTION INTRAVENOUS at 12:02

## 2022-02-09 RX ADMIN — ROCURONIUM BROMIDE 50 MG: 10 INJECTION, SOLUTION INTRAVENOUS at 11:02

## 2022-02-09 RX ADMIN — FENTANYL CITRATE 25 MCG: 50 INJECTION, SOLUTION INTRAMUSCULAR; INTRAVENOUS at 12:02

## 2022-02-09 RX ADMIN — PROPOFOL 200 MG: 10 INJECTION, EMULSION INTRAVENOUS at 11:02

## 2022-02-09 RX ADMIN — PROPOFOL 30 MG: 10 INJECTION, EMULSION INTRAVENOUS at 01:02

## 2022-02-09 RX ADMIN — CELECOXIB 400 MG: 200 CAPSULE ORAL at 11:02

## 2022-02-09 RX ADMIN — BUPIVACAINE HYDROCHLORIDE AND EPINEPHRINE 20 ML: 5; 5 INJECTION, SOLUTION EPIDURAL; INTRACAUDAL; PERINEURAL at 11:02

## 2022-02-09 RX ADMIN — Medication 20 MG: at 12:02

## 2022-02-09 RX ADMIN — DEXAMETHASONE SODIUM PHOSPHATE 8 MG: 4 INJECTION, SOLUTION INTRAMUSCULAR; INTRAVENOUS at 12:02

## 2022-02-09 RX ADMIN — LIDOCAINE HYDROCHLORIDE 100 MG: 20 INJECTION, SOLUTION INTRAVENOUS at 11:02

## 2022-02-09 RX ADMIN — MIDAZOLAM HYDROCHLORIDE 2 MG: 1 INJECTION, SOLUTION INTRAMUSCULAR; INTRAVENOUS at 11:02

## 2022-02-09 RX ADMIN — ACETAMINOPHEN 1000 MG: 500 TABLET ORAL at 11:02

## 2022-02-09 NOTE — TRANSFER OF CARE
"Anesthesia Transfer of Care Note    Patient: Jose A Barber    Procedure(s) Performed: Procedure(s) (LRB):  REPAIR, TENDON, ACHILLES (Right)    Patient location: PACU    Anesthesia Type: general    Transport from OR: Transported from OR on 6-10 L/min O2 by face mask with adequate spontaneous ventilation    Post pain: adequate analgesia    Post assessment: no apparent anesthetic complications    Post vital signs: stable    Level of consciousness: sedated    Nausea/Vomiting: no nausea/vomiting    Complications: none    Transfer of care protocol was followed      Last vitals:   Visit Vitals  /65 (BP Location: Right arm, Patient Position: Lying)   Pulse 70   Temp 36.5 °C (97.7 °F) (Temporal)   Resp 20   Ht 5' 11" (1.803 m)   Wt 73.9 kg (163 lb)   SpO2 100%   BMI 22.73 kg/m²     "

## 2022-02-09 NOTE — PLAN OF CARE
Patient ready to be discharged. VSS and in no distress.    Instructions given, patient verbalizes understanding. Pain assessed and addressed. Tolerates liquids by mouth with no nausea and vomiting.     Patient has required DME    Medications delivered by bedside pharmacy.

## 2022-02-09 NOTE — ANESTHESIA PROCEDURE NOTES
Adductor Canal Single Injection Block    Patient location during procedure: pre-op   Block not for primary anesthetic.  Reason for block: at surgeon's request and post-op pain management   Post-op Pain Location: Right leg pain  Start time: 2/9/2022 11:43 AM  Timeout: 2/9/2022 11:39 AM   End time: 2/9/2022 11:45 AM    Staffing  Authorizing Provider: Andrea Antonio MD  Performing Provider: Niels Page MD    Preanesthetic Checklist  Completed: patient identified, IV checked, site marked, risks and benefits discussed, surgical consent, monitors and equipment checked, pre-op evaluation and timeout performed  Peripheral Block  Patient position: supine  Prep: ChloraPrep  Patient monitoring: heart rate, cardiac monitor, continuous pulse ox, continuous capnometry and frequent blood pressure checks  Block type: adductor canal  Laterality: right  Injection technique: single shot  Needle  Needle type: Echogenic   Needle gauge: 20 G  Needle length: 4 in  Needle localization: anatomical landmarks and ultrasound guidance   -ultrasound image captured on disc.  Assessment  Injection assessment: negative aspiration, negative parasthesia and local visualized surrounding nerve  Paresthesia pain: none  Heart rate change: no  Slow fractionated injection: yes  Pain Tolerance: comfortable throughout block and no complaints  Medications:  Medication Administration Time: 2/9/2022 11:45 AM  Medications: bupivacaine 0.5%-EPINEPHrine 1:200,000 injection, 20 mL    Medications:  Bolus administered: 20 mL of 0.25 ropivacaine  Epinephrine added: 3.75 mcg/mL (1/300,000)  Additional Notes  VSS.  [facility] RN monitoring vitals throughout procedure.  Patient tolerated procedure well.  20 mL 0.5% bupivacaine with epinephrine and additives injected under ultrasound.

## 2022-02-09 NOTE — ANESTHESIA PREPROCEDURE EVALUATION
02/09/2022  Jose A Barber is a 31 y.o., male.    Anesthesia Evaluation    I have reviewed the Patient Summary Reports.    I have reviewed the Nursing Notes. I have reviewed the NPO Status.   I have reviewed the Medications.     Review of Systems  Anesthesia Hx:  No problems with previous Anesthesia  Denies Family Hx of Anesthesia complications.   Denies Personal Hx of Anesthesia complications.   Social:  Non-Smoker, No Alcohol Use    Hematology/Oncology:  Hematology Normal        EENT/Dental:EENT/Dental Normal   Cardiovascular:   Exercise tolerance: good    Pulmonary:  Pulmonary Normal    Renal/:  Renal/ Normal     Hepatic/GI:  Hepatic/GI Normal    Neurological:  Neurology Normal    Endocrine:  Endocrine Normal    Dermatological:  Skin Normal    Psych:  Psychiatric Normal           Physical Exam  General:  Well nourished    Airway/Jaw/Neck:  Airway Findings: Mouth Opening: Normal Tongue: Normal  General Airway Assessment: Adult  Mallampati: II  TM Distance: Normal, at least 6 cm  Jaw/Neck Findings:  Neck ROM: Normal ROM     Eyes/Ears/Nose:  EYES/EARS/NOSE FINDINGS: Normal   Dental:  Dental Findings: In tact   Chest/Lungs:  Chest/Lungs Findings: Clear to auscultation, Normal Respiratory Rate     Heart/Vascular:  Heart Findings: Rate: Normal  Rhythm: Regular Rhythm  Sounds: Normal        Mental Status:  Mental Status Findings:  Cooperative, Alert and Oriented         Anesthesia Plan  Type of Anesthesia, risks & benefits discussed:  Anesthesia Type:  regional, general    Patient's Preference:   Plan Factors:          Intra-op Monitoring Plan: standard ASA monitors  Intra-op Monitoring Plan Comments:   Post Op Pain Control Plan: multimodal analgesia, per primary service following discharge from PACU and peripheral nerve block  Post Op Pain Control Plan Comments:     Induction:   IV  Beta Blocker:  Patient is  not currently on a Beta-Blocker (No further documentation required).       Informed Consent: Patient understands risks and agrees with Anesthesia plan.  Questions answered. Anesthesia consent signed with patient.  ASA Score: 1     Day of Surgery Review of History & Physical:    H&P update referred to the surgeon.         Ready For Surgery From Anesthesia Perspective.

## 2022-02-09 NOTE — PLAN OF CARE
Pre op complete. Belongings in locker and crutches in PP 4. GF is ride home and is about an hour away.

## 2022-02-09 NOTE — OPERATIVE NOTE ADDENDUM
Certification of Assistant at Surgery       Surgery Date: 2/9/2022     Participating Surgeons:  Surgeon(s) and Role:     * Toribio Allan MD - Primary    Procedures:  Procedure(s) (LRB):  REPAIR, TENDON, ACHILLES (Right)    Assistant Surgeon's Certification of Necessity:  I understand that section 1842 (b) (6) (d) of the Social Security Act generally prohibits Medicare Part B reasonable charge payment for the services of assistants at surgery in teaching hospitals when qualified residents are available to furnish such services. I certify that the services for which payment is claimed were medically necessary, and that no qualified resident was available to perform the services. I further understand that these services are subject to post-payment review by the Medicare carrier.      Augie Lauren PA-C    02/09/2022  5:19 PM

## 2022-02-09 NOTE — PLAN OF CARE
VSS. Pt able to tolerate oral liquids. Pt denies c/o pain. Splint intact. Pt received home meds per bedside delivery. Polar care power adapter placed with pts personal belongings. Crutches at bedside for home use. No distress noted. Pt states he is ready for D/C. D/C instructions reviewed with pt and significant other, verbalized understanding.

## 2022-02-09 NOTE — OP NOTE
DATE OF PROCEDURE: 02/09/2022    SURGEON: Toribio Allan MD    ASSISTANT:   1. Augie Lauren PA-C  2. Andrew RUEDA     There is no available resident or fellow the services of physician assistant were required    PREOPERATIVE DIAGNOSIS:  Right Achilles tendon tear.     POSTOPERATIVE DIAGNOSIS: Right Achilles tendon tear.     PROCEDURE PERFORMED:   1. Right knee Achilles tendon reconstruction.       ANESTHESIA: General     BLOOD LOSS: Minimal.     * No implants in log *      DRAINS: None.       COMPLICATIONS: None.     CONDITION ON TRANSFER: The patient was extubated and moved to the   recovery room in stable condition, with compartments soft and capillary refill   less than a second in all digits.     BRIEF INDICATION OF MEDICAL NECESSITY: The patient is a 31 y.o. year-old male  who was seen in the office with a history with a history and physical examination findings consistent with a right Achilles tendon rupture. Nonoperative versus operative options were discussed. The risks and benefits were discussed with the patient. The patient acknowledged understanding and wished to proceed with operative intervention. Informed consent was obtained prior to the procedure. Details of the surgical procedure were explained, including incisions and probable rehabilitation course. The patient understands the likely length of convalescence after surgery; and we have explained the risks, benefits, and alternatives of surgery. Reasonable expectations and potential complications were discussed and acknowledged, including but not limited to infection, bleeding, blood clots, (DVT and/or PE), nerve injury, retear, instability, continued pain and stiffness. It was also explained that there was a chance of failure which may require further management. The patient agreed and understood and wished to proceed.     PROCEDURE IN DETAIL: Operative site was marked by the operative surgeon.   The patient was then taken to the  operating room and placed prone on the   Operating Room table and with well-padded comfortable position, and room   for the abdominal cavity. General anesthesia was administered previously   by the anesthesia team. Preoperative   antibiotics were administered. A well-padded tourniquet was placed on   the right lower extremity. The right lower extremity was then prepped and   draped in the usual sterile fashion. The medial-based incision was made   and full-thickness flaps were made down and dissection was carried down   to the paratenon and paratenon was incised. Tear was visualized.     The tendon quality of the tendon was adequate.     The tendon edges were freshened up sharply and debrided and whip stitch with #2 FiberWire was   placed in standard fashion starting at the distal tendon proximal and distal x2   with 4 sutures out of the distal stump with good suture hold. This was   repaired for the proximal stump as well giving itself 4 sutures across   the repair site. These were tied with achilles in relaxed position and the   tendon repair had good quality repair after tying knots and securing   knots. Knots were buried as well. A #2-0 FiberWire was then used for epitendinous repair.    The area was copiously and gently irrigated. Paratenon was closed using   a 0-Vicryl suture in an interrupted fashion. Subcutnateous layer was closed using 3-0 Vicryl sutures.  Skin was closed using 3-0   nylon suture in a running fashion. Wounds were dressed with   Xeroform, 4x4s, and cast padding. The posterior wall splint and equinus was placed   and the patient was extubated and moved to the recovery room in stable   condition with compartments soft and capillary refill less than a second   in all digits.     POSTOPERATIVE PLAN: We will follow the Achilles tendon reconstruction   Guidelines:  Remain nonweightbearing for 6 weeks.   Will be transitioned to cast in 2 weeks for 4 weeks, then to walking boot 2 weeks.  Then start  partial weightbearing at the 6 weeks yan, then transition to full weightbearing at 8-10 week yan.

## 2022-02-09 NOTE — BRIEF OP NOTE
[place] - Surgery (Hospital)  Brief Operative Note    Surgery Date: 2/9/2022     Surgeon(s) and Role:     * Toribio Allan MD - Primary    Assisting Surgeon: None    Pre-op Diagnosis:  Rupture of right Achilles tendon, initial encounter [S86.011A]    Post-op Diagnosis:  Post-Op Diagnosis Codes:     * Rupture of right Achilles tendon, initial encounter [S86.011A]    Procedure(s) (LRB):  REPAIR, TENDON, ACHILLES (Right)    Anesthesia: General    Operative Findings: right achilles rupture    Estimated Blood Loss: * No values recorded between 2/9/2022 12:15 PM and 2/9/2022  1:21 PM *         Specimens:   Specimen (24h ago, onward)            None            Discharge Note    OUTCOME: Patient tolerated treatment/procedure well without complication and is now ready for discharge.    DISPOSITION: Home or Self Care    FINAL DIAGNOSIS:  Achilles rupture, right    FOLLOWUP: In clinic    DISCHARGE INSTRUCTIONS:    Discharge Procedure Orders   Diet general     Call MD for:  temperature >100.4     Call MD for:  persistent nausea and vomiting     Call MD for:  severe uncontrolled pain     Call MD for:  difficulty breathing, headache or visual disturbances     Call MD for:  redness, tenderness, or signs of infection (pain, swelling, redness, odor or green/yellow discharge around incision site)     Call MD for:  hives     Call MD for:  persistent dizziness or light-headedness     Call MD for:  extreme fatigue     Leave dressing on - Keep it clean, dry, and intact until clinic visit

## 2022-02-09 NOTE — ANESTHESIA PROCEDURE NOTES
Intubation    Date/Time: 2/9/2022 11:56 AM  Performed by: Homa Grijalva CRNA  Authorized by: Andrea Antonio MD     Intubation:     Induction:  Intravenous    Intubated:  Postinduction    Mask Ventilation:  Easy mask    Attempts:  1    Attempted By:  CRNA    Method of Intubation:  Direct    Blade:  Rivera 2    Laryngeal View Grade: Grade I - full view of cords      Difficult Airway Encountered?: No      Complications:  None    Airway Device:  Oral endotracheal tube    Airway Device Size:  7.5    Style/Cuff Inflation:  Cuffed    Inflation Amount (mL):  8    Tube secured:  22    Secured at:  The lips    Placement Verified By:  Capnometry    Complicating Factors:  None    Findings Post-Intubation:  BS equal bilateral

## 2022-02-09 NOTE — ANESTHESIA PROCEDURE NOTES
Popliteal Sciatic Single Injection Block    Patient location during procedure: pre-op   Block not for primary anesthetic.  Reason for block: at surgeon's request and post-op pain management   Post-op Pain Location: Right leg pain  Start time: 2/9/2022 11:40 AM  Timeout: 2/9/2022 11:39 AM   End time: 2/9/2022 11:43 AM    Staffing  Authorizing Provider: Andrea Antonio MD  Performing Provider: Niels Page MD    Preanesthetic Checklist  Completed: patient identified, IV checked, site marked, risks and benefits discussed, surgical consent, monitors and equipment checked, pre-op evaluation and timeout performed  Peripheral Block  Patient position: supine  Prep: ChloraPrep  Patient monitoring: heart rate, cardiac monitor, continuous pulse ox, continuous capnometry and frequent blood pressure checks  Block type: popliteal  Laterality: right  Injection technique: single shot  Needle  Needle type: Echogenic   Needle gauge: 20 G  Needle length: 4 in  Needle localization: anatomical landmarks and ultrasound guidance   -ultrasound image captured on disc.  Assessment  Injection assessment: negative aspiration, negative parasthesia and local visualized surrounding nerve  Paresthesia pain: none  Heart rate change: no  Slow fractionated injection: yes  Pain Tolerance: comfortable throughout block and no complaints  Medications:  Medication Administration Time: 2/9/2022 11:42 AM  Medications: bupivacaine 0.5%-EPINEPHrine 1:200,000 injection, 30 mL    Additional Notes  VSS.  [facility] RN monitoring vitals throughout procedure.  Patient tolerated procedure well.  30 mL 0.5% bupivacaine with epinephrine injected under ultrasound guidance.

## 2022-02-10 NOTE — TELEPHONE ENCOUNTER
Had surgery today. Was advised to take 2 pills after  Numbness wears off and pt does not remember which medications that he was advised to take. Medication regimen reviewed with the patient. On call provider Dr. Sage pérez and MD returned immediate call. MD advised to take the pain medication as noted. States to advise patient to take the ASA and  Celebrex tonight. Pt advised.    Reason for Disposition   [1] Caller has URGENT medicine question about med that PCP or specialist prescribed AND [2] triager unable to answer question    Protocols used: MEDICATION QUESTION CALL-A-

## 2022-02-16 NOTE — ANESTHESIA POSTPROCEDURE EVALUATION
Anesthesia Post Evaluation    Patient: Jose A Barber    Procedure(s) Performed: Procedure(s) (LRB):  REPAIR, TENDON, ACHILLES (Right)    Final Anesthesia Type: general      Patient location during evaluation: PACU  Patient participation: Yes- Able to Participate  Level of consciousness: awake and alert and oriented  Post-procedure vital signs: reviewed and stable  Pain management: adequate  Airway patency: patent    PONV status at discharge: No PONV  Anesthetic complications: no      Cardiovascular status: hemodynamically stable  Respiratory status: unassisted, spontaneous ventilation and room air  Hydration status: euvolemic  Follow-up not needed.          Vitals Value Taken Time   /77 02/09/22 1430   Temp 36.6 °C (97.9 °F) 02/09/22 1430   Pulse 54 02/09/22 1430   Resp 11 02/09/22 1430   SpO2 100 % 02/09/22 1430         Event Time   Out of Recovery 14:12:00         Pain/Armen Score: No data recorded

## 2022-02-24 ENCOUNTER — OFFICE VISIT (OUTPATIENT)
Dept: SPORTS MEDICINE | Facility: CLINIC | Age: 32
End: 2022-02-24
Payer: MEDICAID

## 2022-02-24 VITALS
SYSTOLIC BLOOD PRESSURE: 127 MMHG | HEIGHT: 71 IN | DIASTOLIC BLOOD PRESSURE: 80 MMHG | WEIGHT: 163 LBS | BODY MASS INDEX: 22.82 KG/M2 | HEART RATE: 82 BPM

## 2022-02-24 DIAGNOSIS — S86.011D RUPTURE OF RIGHT ACHILLES TENDON, SUBSEQUENT ENCOUNTER: Primary | ICD-10-CM

## 2022-02-24 DIAGNOSIS — G89.18 POST-OPERATIVE PAIN: ICD-10-CM

## 2022-02-24 PROCEDURE — 3079F PR MOST RECENT DIASTOLIC BLOOD PRESSURE 80-89 MM HG: ICD-10-PCS | Mod: CPTII,,, | Performed by: ORTHOPAEDIC SURGERY

## 2022-02-24 PROCEDURE — 3074F SYST BP LT 130 MM HG: CPT | Mod: CPTII,,, | Performed by: ORTHOPAEDIC SURGERY

## 2022-02-24 PROCEDURE — 99213 OFFICE O/P EST LOW 20 MIN: CPT | Mod: PBBFAC | Performed by: ORTHOPAEDIC SURGERY

## 2022-02-24 PROCEDURE — 99024 PR POST-OP FOLLOW-UP VISIT: ICD-10-PCS | Mod: ,,, | Performed by: ORTHOPAEDIC SURGERY

## 2022-02-24 PROCEDURE — 1159F MED LIST DOCD IN RCRD: CPT | Mod: CPTII,,, | Performed by: ORTHOPAEDIC SURGERY

## 2022-02-24 PROCEDURE — 99999 PR PBB SHADOW E&M-EST. PATIENT-LVL III: ICD-10-PCS | Mod: PBBFAC,,, | Performed by: ORTHOPAEDIC SURGERY

## 2022-02-24 PROCEDURE — 3008F BODY MASS INDEX DOCD: CPT | Mod: CPTII,,, | Performed by: ORTHOPAEDIC SURGERY

## 2022-02-24 PROCEDURE — 99999 PR PBB SHADOW E&M-EST. PATIENT-LVL III: CPT | Mod: PBBFAC,,, | Performed by: ORTHOPAEDIC SURGERY

## 2022-02-24 PROCEDURE — 1159F PR MEDICATION LIST DOCUMENTED IN MEDICAL RECORD: ICD-10-PCS | Mod: CPTII,,, | Performed by: ORTHOPAEDIC SURGERY

## 2022-02-24 PROCEDURE — 3008F PR BODY MASS INDEX (BMI) DOCUMENTED: ICD-10-PCS | Mod: CPTII,,, | Performed by: ORTHOPAEDIC SURGERY

## 2022-02-24 PROCEDURE — 3079F DIAST BP 80-89 MM HG: CPT | Mod: CPTII,,, | Performed by: ORTHOPAEDIC SURGERY

## 2022-02-24 PROCEDURE — 1160F RVW MEDS BY RX/DR IN RCRD: CPT | Mod: CPTII,,, | Performed by: ORTHOPAEDIC SURGERY

## 2022-02-24 PROCEDURE — 1160F PR REVIEW ALL MEDS BY PRESCRIBER/CLIN PHARMACIST DOCUMENTED: ICD-10-PCS | Mod: CPTII,,, | Performed by: ORTHOPAEDIC SURGERY

## 2022-02-24 PROCEDURE — 3074F PR MOST RECENT SYSTOLIC BLOOD PRESSURE < 130 MM HG: ICD-10-PCS | Mod: CPTII,,, | Performed by: ORTHOPAEDIC SURGERY

## 2022-02-24 PROCEDURE — 99024 POSTOP FOLLOW-UP VISIT: CPT | Mod: ,,, | Performed by: ORTHOPAEDIC SURGERY

## 2022-03-24 ENCOUNTER — OFFICE VISIT (OUTPATIENT)
Dept: SPORTS MEDICINE | Facility: CLINIC | Age: 32
End: 2022-03-24
Payer: MEDICAID

## 2022-03-24 VITALS
HEART RATE: 69 BPM | DIASTOLIC BLOOD PRESSURE: 85 MMHG | BODY MASS INDEX: 22.82 KG/M2 | WEIGHT: 163 LBS | SYSTOLIC BLOOD PRESSURE: 126 MMHG | HEIGHT: 71 IN

## 2022-03-24 DIAGNOSIS — S86.011D RUPTURE OF RIGHT ACHILLES TENDON, SUBSEQUENT ENCOUNTER: ICD-10-CM

## 2022-03-24 DIAGNOSIS — Z98.890 S/P ACHILLES TENDON REPAIR: Primary | ICD-10-CM

## 2022-03-24 PROCEDURE — 99024 POSTOP FOLLOW-UP VISIT: CPT | Mod: ,,, | Performed by: STUDENT IN AN ORGANIZED HEALTH CARE EDUCATION/TRAINING PROGRAM

## 2022-03-24 PROCEDURE — 3008F PR BODY MASS INDEX (BMI) DOCUMENTED: ICD-10-PCS | Mod: CPTII,,, | Performed by: STUDENT IN AN ORGANIZED HEALTH CARE EDUCATION/TRAINING PROGRAM

## 2022-03-24 PROCEDURE — 3074F PR MOST RECENT SYSTOLIC BLOOD PRESSURE < 130 MM HG: ICD-10-PCS | Mod: CPTII,,, | Performed by: STUDENT IN AN ORGANIZED HEALTH CARE EDUCATION/TRAINING PROGRAM

## 2022-03-24 PROCEDURE — 3074F SYST BP LT 130 MM HG: CPT | Mod: CPTII,,, | Performed by: STUDENT IN AN ORGANIZED HEALTH CARE EDUCATION/TRAINING PROGRAM

## 2022-03-24 PROCEDURE — 99999 PR PBB SHADOW E&M-EST. PATIENT-LVL III: ICD-10-PCS | Mod: PBBFAC,,, | Performed by: STUDENT IN AN ORGANIZED HEALTH CARE EDUCATION/TRAINING PROGRAM

## 2022-03-24 PROCEDURE — 3079F PR MOST RECENT DIASTOLIC BLOOD PRESSURE 80-89 MM HG: ICD-10-PCS | Mod: CPTII,,, | Performed by: STUDENT IN AN ORGANIZED HEALTH CARE EDUCATION/TRAINING PROGRAM

## 2022-03-24 PROCEDURE — 99024 PR POST-OP FOLLOW-UP VISIT: ICD-10-PCS | Mod: ,,, | Performed by: STUDENT IN AN ORGANIZED HEALTH CARE EDUCATION/TRAINING PROGRAM

## 2022-03-24 PROCEDURE — 3079F DIAST BP 80-89 MM HG: CPT | Mod: CPTII,,, | Performed by: STUDENT IN AN ORGANIZED HEALTH CARE EDUCATION/TRAINING PROGRAM

## 2022-03-24 PROCEDURE — 1160F RVW MEDS BY RX/DR IN RCRD: CPT | Mod: CPTII,,, | Performed by: STUDENT IN AN ORGANIZED HEALTH CARE EDUCATION/TRAINING PROGRAM

## 2022-03-24 PROCEDURE — 1160F PR REVIEW ALL MEDS BY PRESCRIBER/CLIN PHARMACIST DOCUMENTED: ICD-10-PCS | Mod: CPTII,,, | Performed by: STUDENT IN AN ORGANIZED HEALTH CARE EDUCATION/TRAINING PROGRAM

## 2022-03-24 PROCEDURE — 1159F PR MEDICATION LIST DOCUMENTED IN MEDICAL RECORD: ICD-10-PCS | Mod: CPTII,,, | Performed by: STUDENT IN AN ORGANIZED HEALTH CARE EDUCATION/TRAINING PROGRAM

## 2022-03-24 PROCEDURE — 99213 OFFICE O/P EST LOW 20 MIN: CPT | Mod: PBBFAC | Performed by: STUDENT IN AN ORGANIZED HEALTH CARE EDUCATION/TRAINING PROGRAM

## 2022-03-24 PROCEDURE — 99999 PR PBB SHADOW E&M-EST. PATIENT-LVL III: CPT | Mod: PBBFAC,,, | Performed by: STUDENT IN AN ORGANIZED HEALTH CARE EDUCATION/TRAINING PROGRAM

## 2022-03-24 PROCEDURE — 1159F MED LIST DOCD IN RCRD: CPT | Mod: CPTII,,, | Performed by: STUDENT IN AN ORGANIZED HEALTH CARE EDUCATION/TRAINING PROGRAM

## 2022-03-24 PROCEDURE — 3008F BODY MASS INDEX DOCD: CPT | Mod: CPTII,,, | Performed by: STUDENT IN AN ORGANIZED HEALTH CARE EDUCATION/TRAINING PROGRAM

## 2022-03-24 NOTE — PROGRESS NOTES
Subjective:          Chief Complaint: Jose A Barber is a 31 y.o. male who had concerns including Post-op Evaluation of the Right Ankle.    HPI     Jose A Barber is a 31 y.o. male presents 6 weeks status post below.  He is doing very well, he has no pain.  He has been in a cast for the past 4 weeks.  He states he has been compliant with his postoperative instructions and has been nonweightbearing.  He is pleased with his lack of pain.  Denies any calf soreness, shortness of breath, or numbness or paresthesias to the right lower extremity.    PROCEDURE PERFORMED with Toribio Allan on 02/09/2022:   1. Right Achilles tendon repair    Review of Systems   Constitutional: Negative.   HENT: Negative.    Eyes: Negative.    Cardiovascular: Negative.    Respiratory: Negative.    Endocrine: Negative.    Hematologic/Lymphatic: Negative.    Skin: Negative.    Musculoskeletal: Positive for stiffness. Negative for arthritis, falls, joint pain, joint swelling, muscle cramps and muscle weakness.   Gastrointestinal: Negative.    Genitourinary: Negative.    Neurological: Negative.    Psychiatric/Behavioral: Negative.    Allergic/Immunologic: Negative.                    Objective:        General: Jose A is well-developed, well-nourished, appears stated age, in no acute distress, alert and oriented to time, place and person.     General    Nursing note and vitals reviewed.  Constitutional: He is oriented to person, place, and time. He appears well-developed and well-nourished. No distress.   HENT:   Head: Normocephalic and atraumatic.   Nose: Nose normal.   Eyes: EOM are normal.   Cardiovascular: Intact distal pulses.    Pulmonary/Chest: Effort normal. No respiratory distress.   Neurological: He is alert and oriented to person, place, and time.   Psychiatric: He has a normal mood and affect. His behavior is normal. Judgment and thought content normal.         Right Ankle/Foot Exam     Inspection   Scars: present  Deformity:  absent  Erythema: absent  Bruising: Ankle - absent Foot - absent  Effusion: Ankle - absent Foot - absent  Atrophy: Ankle - absent Foot - absent    Other   Sensation: normal    Comments:  Incision sites healing well, without signs of erythema, infection, or wound dehiscence    Passive plantar flexion with Mata test        Vascular Exam     Right Pulses  Dorsalis Pedis:      2+  Posterior Tibial:      2+                    Assessment:     Jose A Barber is a 31 y.o. male 6 weeks status post above and doing very well.  No pain.  Encounter Diagnoses   Name Primary?    S/P Achilles tendon repair Yes    Rupture of right Achilles tendon, subsequent encounter           Plan:       He is doing very well.  The cast was removed today and we will place him in a cam boot with heel lifts to comfort.  He will do 10 more days of nonweightbearing and then ease into weight-bearing as tolerated.  He will begin physical therapy.  He will return to clinic in 6 weeks for repeat evaluation    All of their questions were answered.  They will call the clinic with any questions or concerns in the interim.    Should the patient's symptoms worsen, persist, or fail to improve they should return for reevaluation and I would be happy to see them back anytime.        Toribio Allan M.D.     Please be aware that this note has been generated with the assistance of [employer] voice-to-text.  Please excuse any spelling or grammatical errors.    Thank you for choosing Dr. Toribio Allan for your sports medicine care. It is our goal to provide you with exceptional care that will help keep you healthy, active, and get you back in the game.     If you felt that you received exemplary care today, please consider leaving feedback for Dr. Allan on [employer] at [email or web address].    Please do not hesitate to reach out to us via email, phone, or MyChart with any questions, concerns, or feedback.

## 2022-03-28 ENCOUNTER — CLINICAL SUPPORT (OUTPATIENT)
Dept: REHABILITATION | Facility: HOSPITAL | Age: 32
End: 2022-03-28
Payer: MEDICAID

## 2022-03-28 DIAGNOSIS — Z74.09 IMPAIRED FUNCTIONAL MOBILITY, BALANCE, GAIT, AND ENDURANCE: ICD-10-CM

## 2022-03-28 DIAGNOSIS — Z98.890 S/P ACHILLES TENDON REPAIR: ICD-10-CM

## 2022-03-28 DIAGNOSIS — S86.011D RUPTURE OF RIGHT ACHILLES TENDON, SUBSEQUENT ENCOUNTER: ICD-10-CM

## 2022-03-28 DIAGNOSIS — R53.1 WEAKNESS: ICD-10-CM

## 2022-03-28 DIAGNOSIS — M25.60 LIMITED JOINT RANGE OF MOTION: ICD-10-CM

## 2022-03-28 PROCEDURE — 97110 THERAPEUTIC EXERCISES: CPT | Mod: PN | Performed by: PHYSICAL THERAPIST

## 2022-03-28 PROCEDURE — 97161 PT EVAL LOW COMPLEX 20 MIN: CPT | Mod: PN | Performed by: PHYSICAL THERAPIST

## 2022-03-28 NOTE — PLAN OF CARE
[facility]  Physical Therapy Initial Evaluation    Date: 3/28/2022   Name: Jose A Barber  Clinic Number: [id number]    Therapy Diagnosis:   Encounter Diagnoses   Name Primary?    S/P Achilles tendon repair     Rupture of right Achilles tendon, subsequent encounter     Limited joint range of motion     Weakness     Impaired functional mobility, balance, gait, and endurance      Physician: Toribio Allan MD    Physician Orders: PT Eval and Treat   Medical Diagnosis from Referral: Rupture of right achilles tendon   Evaluation Date: 3/28/2022  Authorization Period Expiration: 03/24/23  Plan of Care Expiration: 05/23/22  Progress Note Due: 04/28/22  Visit # / Visits authorized: 1/ 1   FOTO: 1/ 3     Time In: 10:58 AM   Time Out: 11:34 AM  Total Billable Time: 36 minutes    Precautions: Standard and Weightbearing    Subjective   Date of onset: 02/09/22  History of current condition - Jose A reports: rupturing his achilles and having it repaired on 02/09/22. He has been NWB since surgery and is to be NWB for 1 more week. He denies any pain at this time.        Past Medical History:   Diagnosis Date    Hx of vasectomy      Jose A Barber  has a past surgical history that includes Repair of Achilles tendon (Right, 2/9/2022).    Jose A has a current medication list which includes the following prescription(s): aspirin, oxycodone, and promethazine.    Review of patient's allergies indicates:  No Known Allergies     Imaging: none    Prior Therapy: None   Social History:  lives alone  Occupation: Carpentry; currently off work following surgery   Prior Level of Function: No limitations with ADL or recreational activities   Current Level of Function: Unable to work, unable to play basketball, unable to go grocery shopping     Pain:  Current 0/10, worst 0/10, best 0/10   Location: right foot    Description: warm sensation   Aggravating Factors: Night Time  Easing Factors: nothing    Pts goals:  To be able to return to walking normally. Return to work       Objective   Mental status: alert, oriented x3  Posture/ Alignment: Good    GAIT DEVIATIONS: Jose A amb with NWB on R LE with axillary crutches .    Observation: No swelling, no visible swelling       SFMA Screen  Squat: NT  SLS: R: NT L: 15 seconds without sway   Ambulation:      Strength and Range of Motion (degrees)   Right LE Left LE   Inversion 3+/5 5/5   Eversion 3+/5   5/5     Dorsiflexion 3+/5   5/5     Plantarflexion 3+/5   5/5     Great toe extension 5/5      5/5          Ankle dorsiflexion +15 (+5) 0 (8)   Ankle plantarflexion 40 50   Ankle eversion  8 15   Ankle inversion  15 20     Palpation: No TTP     Joint Mobility  - Talocrural Traction:   - Talocrural: restricted   - Subtalar: restricted      Palpation:  No TTP       Pt/family was provided educational information, including: role of PT, goals for PT, scheduling - pt verbalized understanding. Discussed insurance plan with pt.       CMS Impairment/Limitation/Restriction for FOTO Ankle Survey    Therapist reviewed FOTO scores for Jose A Barber on 3/28/2022.   FOTO documents entered into [employer] - see Media section.    Limitation Score: 83%  Category: Mobility    Current : CM = at least 80% but < 100% impaired limited or restricted  Goal: CI = at least 1% but < 20% impaired, limited or restricted              Treatment Time In: 11:25 AM   Treatment Time Out: 11:34 AM   Total Treatment time separate from Evaluation: 9 minutes      Jose A received therapeutic exercises to develop strength and ROM for 9 minutes including:  HEP setup and instruction; handout issued   Ankle alphabet 1x   Ankle dorsiflexion AROM w strap assist 30x  Seated heel raises 3 x 10     Home Exercises and Patient Education Provided    Education provided:   - POC  - HEP   - Rehab progression  - Weaning from boot     Written Home Exercises Provided: yes.  Exercises were reviewed and Jose A was able to demonstrate them prior  to the end of the session.  Jose A demonstrated good  understanding of the education provided.     See EMR under Patient Instructions for exercises provided 3/28/2022.      Assessment     Pt presents with S/P right achilles tendon repair.  Primary impairments include limited AROM, PROM, joint mobility, strength, balance, and pain which limits functional mobility. This pt is a good candidate for skilled PT tx and stands to benefit from a combination of manual therapy, therapeutic exercise, gait training, neuromuscular re-education, dry needling and modalities Prn. The pt has been educated on their dx/POC and consents to further PT tx.      Pt prognosis is Excellent.   Pt will benefit from skilled outpatient Physical Therapy to address the deficits stated above and in the chart below, provide pt/family education, and to maximize pt's level of independence.     Plan of care discussed with patient: Yes  Pt's spiritual, cultural and educational needs considered and patient is agreeable to the plan of care and goals as stated below:     Anticipated Barriers for therapy: None at this time     Medical Necessity is demonstrated by the following  History  Co-morbidities and personal factors that may impact the plan of care Co-morbidities:   N/A    Personal Factors:   no deficits     low   Examination  Body Structures and Functions, activity limitations and participation restrictions that may impact the plan of care Body Regions:   lower extremities    Body Systems:    gross symmetry  ROM  strength  balance  gait  motor control    Participation Restrictions:       Activity limitations:   Learning and applying knowledge  no deficits    General Tasks and Commands  no deficits    Communication  no deficits    Mobility  walking    Self care  no deficits    Domestic Life  shopping  doing house work (cleaning house, washing dishes, laundry)    Interactions/Relationships  no deficits    Life Areas  employment    Community and Social  Life  recreation and leisure         high   Clinical Presentation stable and uncomplicated low   Decision Making/ Complexity Score: low     Goals:  Short Term Goals: 4 weeks   1) Pt will be I with established HEP   2) Pt will have 0 degrees or greater dorsiflexion ROM to assist in normalizing gait mechanics  3) Pt will walk community distances with normal gait mechanics     Long Term Goals: 8 weeks   1) Pt will have R calf circumference within 20% of left   2) Pt will perform 25 single leg heel raises on the right to prepare him to initiate running  3) Pt will have 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    Outpatient Physical Therapy 2 times weekly for 8 weeks to include the following interventions: Gait Training, Manual Therapy, Moist Heat/ Ice, Neuromuscular Re-ed, Patient Education, Therapeutic Activities and Therapeutic Exercise.     Vahe Mohan, PT      I CERTIFY THE NEED FOR THESE SERVICES FURNISHED UNDER THIS PLAN OF TREATMENT AND WHILE UNDER MY CARE   Physician's comments:     Physician's Signature: ___________________________________________________

## 2022-04-05 ENCOUNTER — CLINICAL SUPPORT (OUTPATIENT)
Dept: REHABILITATION | Facility: HOSPITAL | Age: 32
End: 2022-04-05
Payer: MEDICAID

## 2022-04-05 DIAGNOSIS — Z74.09 IMPAIRED FUNCTIONAL MOBILITY, BALANCE, GAIT, AND ENDURANCE: ICD-10-CM

## 2022-04-05 DIAGNOSIS — R53.1 WEAKNESS: ICD-10-CM

## 2022-04-05 DIAGNOSIS — M25.60 LIMITED JOINT RANGE OF MOTION: Primary | ICD-10-CM

## 2022-04-05 PROCEDURE — 97110 THERAPEUTIC EXERCISES: CPT | Mod: PN | Performed by: PHYSICAL THERAPIST

## 2022-04-05 NOTE — PROGRESS NOTES
"  Physical Therapy Daily Treatment Note     Name: Jose A [facility] Number: [id number]    Therapy Diagnosis:   Encounter Diagnoses   Name Primary?    Limited joint range of motion Yes    Weakness     Impaired functional mobility, balance, gait, and endurance      Physician: Toribio Allan MD    Visit Date: 4/5/2022  Physician Orders: PT Eval and Treat   Medical Diagnosis from Referral: Rupture of right achilles tendon   Evaluation Date: 3/28/2022  Authorization Period Expiration: 05/23/22  Plan of Care Expiration: 05/23/22  Progress Note Due: 04/28/22  Visit # / Visits authorized: 1/ 16   FOTO: 1/ 3       Time In: 11:31 AM  Time Out: 12:15 PM   Total Billable Time: 44 minutes    Precautions: Standard and Weightbearing    Subjective     Pt reports: That his foot is doing well. I am getting stronger each day  He was compliant with home exercise program.  Response to previous treatment: Initial evaluation   Functional change: Increased weightbearing     Pain: 0/10  Location: right ankle     Objective     Jose A received therapeutic exercises to develop strength, ROM and flexibility for 30 minutes including:  Upright bike 6 min   Seated heel raises 3 x 15  Shuttle B 3 bands 2 min  Shuttle R/L 2 bands 1min ea   4 way ankle 3 x 10 RTB  Bridges 3 x 10   SL hip abduction 3 x 10   Gastroc stretch to neutral 4 x 30"      Jose A received the following manual therapy techniques: Joint mobilizations were applied to the:  for  minutes, including:      Jose A participated in neuromuscular re-education activities to improve: Kinesthetic Sense and Proprioception for 10 minutes. The following activities were included:  Rockerboard PF/DF, IN/EV, CW/CCW  2 min ea   Romberg stance 2 min     Jose A participated in gait training to improve functional mobility and safety for   minutes, including:      Home Exercises Provided and Patient Education Provided     Education provided:   - HEP review  - Progression of weightbearing " and weaning from boot     Written Home Exercises Provided: Patient instructed to cont prior HEP.  Exercises were reviewed and Jose A was able to demonstrate them prior to the end of the session.  Jose A demonstrated good  understanding of the education provided.     See EMR under Patient Instructions for exercises provided prior visit.    Assessment     Good tolerance to addition of new strengthening and ROM activities. Dorsiflexion ROM improved and he is able to reach a neutral position. He will benefit from continued progression of weightbearing activities and light stretching to assist in restoring right ankle mobility   Jose A Is progressing well towards his goals.   Pt prognosis is Excellent.     Pt will continue to benefit from skilled outpatient physical therapy to address the deficits listed in the problem list box on initial evaluation, provide pt/family education and to maximize pt's level of independence in the home and community environment.     Pt's spiritual, cultural and educational needs considered and pt agreeable to plan of care and goals.    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

## 2022-04-08 ENCOUNTER — CLINICAL SUPPORT (OUTPATIENT)
Dept: REHABILITATION | Facility: HOSPITAL | Age: 32
End: 2022-04-08
Payer: MEDICAID

## 2022-04-08 DIAGNOSIS — R53.1 WEAKNESS: ICD-10-CM

## 2022-04-08 DIAGNOSIS — Z74.09 IMPAIRED FUNCTIONAL MOBILITY, BALANCE, GAIT, AND ENDURANCE: ICD-10-CM

## 2022-04-08 DIAGNOSIS — M25.60 LIMITED JOINT RANGE OF MOTION: Primary | ICD-10-CM

## 2022-04-08 PROCEDURE — 97110 THERAPEUTIC EXERCISES: CPT | Mod: PN | Performed by: PHYSICAL THERAPIST

## 2022-04-08 NOTE — PROGRESS NOTES
"  Physical Therapy Daily Treatment Note     Name: Jose A [facility] Number: [id number]    Therapy Diagnosis:   Encounter Diagnoses   Name Primary?    Limited joint range of motion Yes    Weakness     Impaired functional mobility, balance, gait, and endurance      Physician: Toribio Allan MD    Visit Date: 4/8/2022  Physician Orders: PT Eval and Treat   Medical Diagnosis from Referral: Rupture of right achilles tendon   Evaluation Date: 3/28/2022  Authorization Period Expiration: 05/23/22  Plan of Care Expiration: 05/23/22  Progress Note Due: 04/28/22  Visit # / Visits authorized: 2/ 16   FOTO: 1/ 3       Time In: 10:58 AM  Time Out: 11:45 AM   Total Billable Time: 44 minutes    Precautions: Standard and Weightbearing (8 weeks plus 2 days)     Subjective     Pt reports: That he felt good after his last session  He was compliant with home exercise program.  Response to previous treatment: Initial evaluation   Functional change: Increased weightbearing     Pain: 0/10  Location: right ankle     Objective     Jose A received therapeutic exercises to develop strength, ROM and flexibility for 32 minutes including:  Upright bike 6 min   Seated heel raises 3 x 15 15#  Shuttle B 3 bands 2 min  Shuttle R/L 2 bands 1min ea   4 way ankle 3 x 15 RTB  Bridges 3 x 10   SL hip abduction 3 x 10   Gastroc stretch to neutral 4 x 30"      Jose A received the following manual therapy techniques: Joint mobilizations were applied to the:  for  minutes, including:      Jose A participated in neuromuscular re-education activities to improve: Kinesthetic Sense and Proprioception for 12 minutes. The following activities were included:  Rockerboard PF/DF, IN/EV, CW/CCW  2 min ea   Weight shifts S>S, F>B 1 min ea  Tandem stance 1 min ea     Jose A participated in gait training to improve functional mobility and safety for   minutes, including:      Home Exercises Provided and Patient Education Provided     Education provided:   - " HEP review  - Progression of weightbearing and weaning from boot     Written Home Exercises Provided: Patient instructed to cont prior HEP.  Exercises were reviewed and Jose A was able to demonstrate them prior to the end of the session.  Jose A demonstrated good  understanding of the education provided.     See EMR under Patient Instructions for exercises provided prior visit.    Assessment     Jose A was able to add resistance to seated heel raises without pain. Able to progress balance activities with good control, but desired training effect. He will benefit from continued progression of strengthening and ROM activities to prepare for weaning from boot and assist in normalizing gait mechanics.     Jose A Is progressing well towards his goals.   Pt prognosis is Excellent.     Pt will continue to benefit from skilled outpatient physical therapy to address the deficits listed in the problem list box on initial evaluation, provide pt/family education and to maximize pt's level of independence in the home and community environment.     Pt's spiritual, cultural and educational needs considered and pt agreeable to plan of care and goals.    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

## 2022-04-12 ENCOUNTER — CLINICAL SUPPORT (OUTPATIENT)
Dept: REHABILITATION | Facility: HOSPITAL | Age: 32
End: 2022-04-12
Payer: MEDICAID

## 2022-04-12 DIAGNOSIS — M25.60 LIMITED JOINT RANGE OF MOTION: Primary | ICD-10-CM

## 2022-04-12 DIAGNOSIS — R53.1 WEAKNESS: ICD-10-CM

## 2022-04-12 DIAGNOSIS — Z74.09 IMPAIRED FUNCTIONAL MOBILITY, BALANCE, GAIT, AND ENDURANCE: ICD-10-CM

## 2022-04-12 PROCEDURE — 97110 THERAPEUTIC EXERCISES: CPT | Mod: PN | Performed by: PHYSICAL THERAPIST

## 2022-04-12 NOTE — PROGRESS NOTES
"  Physical Therapy Daily Treatment Note     Name: Jose A [facility] Number: [id number]    Therapy Diagnosis:   Encounter Diagnoses   Name Primary?    Limited joint range of motion Yes    Weakness     Impaired functional mobility, balance, gait, and endurance      Physician: Toribio Allan MD    Visit Date: 4/12/2022  Physician Orders: PT Eval and Treat   Medical Diagnosis from Referral: Rupture of right achilles tendon   Evaluation Date: 3/28/2022  Authorization Period Expiration: 05/23/22  Plan of Care Expiration: 05/23/22  Progress Note Due: 04/28/22  Visit # / Visits authorized: 3/ 16   FOTO: 1/ 3       Time In: 11:31 AM  Time Out: 12:15 AM   Total Billable Time: 44 minutes    Precautions: Standard and Weightbearing (8 weeks plus 2 days)     Subjective     Pt reports: That his achilles is getting better and better   He was compliant with home exercise program.  Response to previous treatment: Good tolerance to removal of wedge   Functional change: Increased weightbearing     Pain: 0/10  Location: right ankle     Objective     Jose A received therapeutic exercises to develop strength, ROM and flexibility for 32 minutes including:  Upright bike 6 min   Seated heel raises 3 x 15 20#  Standing heel raises 3 x 15   Shuttle B 3 1/2 bands 2 min  Shuttle R/L 2 1/2 bands 1min ea   Ball squats 3 x 10   4 way ankle 3 x 15 GTB  Bridges 3 x 10 - NP   SL hip abduction 3 x 10 - NP   Gastroc stretch to neutral 4 x 30"      Jose A received the following manual therapy techniques: Joint mobilizations were applied to the: right ankle for 4 minutes, including:  PROM into plantar flexion, medial/lateral calcaneal glides    Jose A participated in neuromuscular re-education activities to improve: Kinesthetic Sense and Proprioception for 8 minutes. The following activities were included:  Rockerboard PF/DF, IN/EV, CW/CCW  1 min ea   Tandem stance 1 min ea   A SLS 2 x 1 min    Jose A participated in gait training to improve " functional mobility and safety for   minutes, including:      Home Exercises Provided and Patient Education Provided     Education provided:   - HEP review  - Progression of weightbearing and weaning from boot     Written Home Exercises Provided: Patient instructed to cont prior HEP.  Exercises were reviewed and Jose A was able to demonstrate them prior to the end of the session.  Jose A demonstrated good  understanding of the education provided.     See EMR under Patient Instructions for exercises provided prior visit.    Assessment     Jose A with good tolerance to addition of new closed chain strengthening activities. Plantar flexion ROM improved and dorsiflexion ROM to neutral. He will benefit from continued strengthening progression.     Jose A Is progressing well towards his goals.   Pt prognosis is Excellent.     Pt will continue to benefit from skilled outpatient physical therapy to address the deficits listed in the problem list box on initial evaluation, provide pt/family education and to maximize pt's level of independence in the home and community environment.     Pt's spiritual, cultural and educational needs considered and pt agreeable to plan of care and goals.    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

## 2022-04-14 ENCOUNTER — CLINICAL SUPPORT (OUTPATIENT)
Dept: REHABILITATION | Facility: HOSPITAL | Age: 32
End: 2022-04-14
Payer: MEDICAID

## 2022-04-14 DIAGNOSIS — Z74.09 IMPAIRED FUNCTIONAL MOBILITY, BALANCE, GAIT, AND ENDURANCE: ICD-10-CM

## 2022-04-14 DIAGNOSIS — M25.60 LIMITED JOINT RANGE OF MOTION: Primary | ICD-10-CM

## 2022-04-14 DIAGNOSIS — R53.1 WEAKNESS: ICD-10-CM

## 2022-04-14 PROCEDURE — 97110 THERAPEUTIC EXERCISES: CPT | Mod: PN | Performed by: PHYSICAL THERAPIST

## 2022-04-14 NOTE — PROGRESS NOTES
"  Physical Therapy Daily Treatment Note     Name: Jose A [facility] Number: [id number]    Therapy Diagnosis:   Encounter Diagnoses   Name Primary?    Limited joint range of motion Yes    Weakness     Impaired functional mobility, balance, gait, and endurance      Physician: Toribio Allan MD    Visit Date: 4/14/2022  Physician Orders: PT Eval and Treat   Medical Diagnosis from Referral: Rupture of right achilles tendon   Evaluation Date: 3/28/2022  Authorization Period Expiration: 05/23/22  Plan of Care Expiration: 05/23/22  Progress Note Due: 04/28/22  Visit # / Visits authorized: 4/ 16   FOTO: 1/ 3       Time In: 10:52 AM  Time Out: 11:37 AM   Total Billable Time: 45 minutes    Precautions: Standard and Weightbearing (9 weeks plus 1 day)     Subjective     Pt reports: That he has been stretching a lot and getter better each day   He was compliant with home exercise program.  Response to previous treatment: Good tolerance to removal of wedge   Functional change: Increased weightbearing     Pain: 0/10  Location: right ankle     Objective     Jose A received therapeutic exercises to develop strength, ROM and flexibility for 35 minutes including:  Upright bike 6 min   Seated heel raises 3 x 15 20#  Standing heel raises at stairs from neutral 3 x 15   Shuttle B 3 1/2 bands 2 min  Shuttle R/L 2 1/2 bands 1min ea   Shuttle heel raises 3 x 15 2 1/2  Bands   Ball squats 3 x 10   4 way ankle 3 x 15 GTB  Bridges 3 x 10 - NP   SL hip abduction 3 x 10 - NP   Gastroc stretch to neutral 4 x 30"      Jose A received the following manual therapy techniques: Joint mobilizations were applied to the: right ankle for  minutes, including:  PROM into plantar flexion, medial/lateral calcaneal glides    Jose A participated in neuromuscular re-education activities to improve: Kinesthetic Sense and Proprioception for 10 minutes. The following activities were included:  Rockerboard PF/DF, IN/EV, CW/CCW  1 min ea   Tandem stance 1 " min ea   A SLS 2 x 1 min    Jose A participated in gait training to improve functional mobility and safety for   minutes, including:      Home Exercises Provided and Patient Education Provided     Education provided:   - HEP review  - Progression of weightbearing and weaning from boot     Written Home Exercises Provided: Patient instructed to cont prior HEP.  Exercises were reviewed and Jose A was able to demonstrate them prior to the end of the session.  Jose A demonstrated good  understanding of the education provided.     See EMR under Patient Instructions for exercises provided prior visit.    Assessment     Jose A was able to add heel raises on the shuttle with light weight and perform light leg press without the boot. Strength and ROM slowly improving. He did tolerate the removal of an additional wedge from his boot. He will benefit from continued progression towards D/C of the boot as long as his MD allows.     Jose A Is progressing well towards his goals.   Pt prognosis is Excellent.     Pt will continue to benefit from skilled outpatient physical therapy to address the deficits listed in the problem list box on initial evaluation, provide pt/family education and to maximize pt's level of independence in the home and community environment.     Pt's spiritual, cultural and educational needs considered and pt agreeable to plan of care and goals.    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

## 2022-04-19 ENCOUNTER — CLINICAL SUPPORT (OUTPATIENT)
Dept: REHABILITATION | Facility: HOSPITAL | Age: 32
End: 2022-04-19
Payer: MEDICAID

## 2022-04-19 DIAGNOSIS — M25.60 LIMITED JOINT RANGE OF MOTION: Primary | ICD-10-CM

## 2022-04-19 DIAGNOSIS — R53.1 WEAKNESS: ICD-10-CM

## 2022-04-19 DIAGNOSIS — Z74.09 IMPAIRED FUNCTIONAL MOBILITY, BALANCE, GAIT, AND ENDURANCE: ICD-10-CM

## 2022-04-19 PROCEDURE — 97110 THERAPEUTIC EXERCISES: CPT | Mod: PN | Performed by: PHYSICAL THERAPIST

## 2022-04-19 NOTE — PROGRESS NOTES
"  Physical Therapy Daily Treatment Note     Name: Jose A [facility] Number: [id number]    Therapy Diagnosis:   Encounter Diagnoses   Name Primary?    Limited joint range of motion Yes    Weakness     Impaired functional mobility, balance, gait, and endurance      Physician: Toribio Allan MD    Visit Date: 4/19/2022  Physician Orders: PT Eval and Treat   Medical Diagnosis from Referral: Rupture of right achilles tendon   Evaluation Date: 3/28/2022  Authorization Period Expiration: 05/23/22  Plan of Care Expiration: 05/23/22  Progress Note Due: 04/28/22  Visit # / Visits authorized: 5/ 16   FOTO: 1/ 3       Time In: 11:30 AM  Time Out: 12:17 PM   Total Billable Time: 47 minutes    Precautions: Standard and Weightbearing (9 weeks plus 6 days)     Subjective     Pt reports: That his foot is getting better each day.   He was compliant with home exercise program.  Response to previous treatment: Good tolerance to removal of wedge   Functional change: Increased weightbearing     Pain: 0/10  Location: right ankle     Objective     ROM: DF: 0 PF: 45     Jose A received therapeutic exercises to develop strength, ROM and flexibility for 35 minutes including:  Upright bike 6 min   Seated heel raises 3 x 15 20#  Standing heel raises at stairs from neutral 3 x 15   Shuttle B 3 1/2 bands 2 min  Shuttle R/L 2 1/2 bands 1min ea   Shuttle heel raises 3 x 15 2 1/2  Bands   Ball squats 3 x 10   Decline heel raises 3 x 15   4 way ankle 3 x 15 GTB  Bridges 3 x 10 - NP   SL hip abduction 3 x 10 - NP   Gastroc stretch to neutral 4 x 30"      Jose A received the following manual therapy techniques: Joint mobilizations were applied to the: right ankle for 4 minutes, including:  Posterior glides of talus to improve dorsiflexion, grades III/IV    Jose A participated in neuromuscular re-education activities to improve: Kinesthetic Sense and Proprioception for 8 minutes. The following activities were included:  Rockerboard PF/DF, " IN/EV, CW/CCW  1 min ea   Tandem stance 1 min ea (Air ex next visit)  SLS 2 x 1 min     Jose A participated in gait training to improve functional mobility and safety for   minutes, including:      Home Exercises Provided and Patient Education Provided     Education provided:   - HEP review  - Progression of weightbearing and weaning from boot     Written Home Exercises Provided: Patient instructed to cont prior HEP.  Exercises were reviewed and Jose A was able to demonstrate them prior to the end of the session.  Jose A demonstrated good  understanding of the education provided.     See EMR under Patient Instructions for exercises provided prior visit.    Assessment     Jose A was able to remove all heel lifts from his boot and walk with good mechanics. Very mild antalgic nature out of the boot. He will benefit from continued focus on ROM and strengthening to promote D/C of walking boot.     Jose A Is progressing well towards his goals.   Pt prognosis is Excellent.     Pt will continue to benefit from skilled outpatient physical therapy to address the deficits listed in the problem list box on initial evaluation, provide pt/family education and to maximize pt's level of independence in the home and community environment.     Pt's spiritual, cultural and educational needs considered and pt agreeable to plan of care and goals.    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

## 2022-04-21 NOTE — PROGRESS NOTES
"  Physical Therapy Daily Treatment Note     Name: Jose A [facility] Number: [id number]    Therapy Diagnosis:   Encounter Diagnoses   Name Primary?    Limited joint range of motion Yes    Weakness     Impaired functional mobility, balance, gait, and endurance      Physician: Toribio Allan MD    Visit Date: 4/22/2022  Physician Orders: PT Eval and Treat   Medical Diagnosis from Referral: Rupture of right achilles tendon   Evaluation Date: 3/28/2022  Authorization Period Expiration: 05/23/22  Plan of Care Expiration: 05/23/22  Progress Note Due: 04/28/22  Visit # / Visits authorized: 6/ 16   FOTO: 1/ 3   PTA Visit #: 1/5    Time In: 1103 AM  Time Out: 1148 AM   Total Billable Time: 45 minutes    Precautions: Standard and Weightbearing (9 weeks plus 6 days)     Subjective     Pt reports: He has not been wearing the boot when he has a short walk, only when it is a longer walk, and its been doing alright.    He was compliant with home exercise program.  Response to previous treatment: Good tolerance to removal of wedge   Functional change: Increased weightbearing     Pain: 0/10  Location: right ankle    Objective     ROM: DF: 0 PF: 45     Jose A received therapeutic exercises to develop strength, ROM and flexibility for 35 minutes including:    Upright bike 6 min   Seated heel raises 3 x 15 20#    Standing heel raises at stairs from neutral 3 x 15 - partially single leg  Decline heel raises on wedge (level: 2) 3 x 15     Ball squats 3 x 10   Shuttle B 3 1/2 bands 2 min  Shuttle R/L 2 1/2 bands 1min ea   Shuttle heel raises 2 1/2 bands 3 x 15      Gastroc stretch to neutral with strap 4 x 30"  4 way ankle 3 x 15 GTB  Bridges 3 x 10 - NP   SL hip abduction 3 x 10 - NP     Jose A received the following manual therapy techniques: Joint mobilizations were applied to the: right ankle for 00 minutes, including:    Posterior glides of talus to improve dorsiflexion, grades III/IV    Jose A participated in neuromuscular " re-education activities to improve: Kinesthetic Sense and Proprioception for 10 minutes. The following activities were included:    Rockerboard PF/DF, IN/EV, CW/CCW 1 min ea   Tandem stance on air ex 1 min ea  SLS on air ex 2 x 1 min     Jose A participated in gait training to improve functional mobility and safety for 00 minutes, including:    Home Exercises Provided and Patient Education Provided     Education provided:   - HEP review  - Progression of weightbearing and weaning from boot     Written Home Exercises Provided: Patient instructed to cont prior HEP.  Exercises were reviewed and Jose A was able to demonstrate them prior to the end of the session.  Jose A demonstrated good  understanding of the education provided.     See EMR under Patient Instructions for exercises provided prior visit.    Assessment     Patient has been able to ambulate without boot for short distances with no increase in pain and minimal antalgic gait noted. He progressed to balancing on compliant surface with moderate instability requiring intermittent UE use to maintain balance.     Jose A Is progressing well towards his goals.   Pt prognosis is Excellent.     Pt will continue to benefit from skilled outpatient physical therapy to address the deficits listed in the problem list box on initial evaluation, provide pt/family education and to maximize pt's level of independence in the home and community environment.     Pt's spiritual, cultural and educational needs considered and pt agreeable to plan of care and goals.    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

## 2022-04-22 ENCOUNTER — CLINICAL SUPPORT (OUTPATIENT)
Dept: REHABILITATION | Facility: HOSPITAL | Age: 32
End: 2022-04-22
Payer: MEDICAID

## 2022-04-22 DIAGNOSIS — R53.1 WEAKNESS: ICD-10-CM

## 2022-04-22 DIAGNOSIS — M25.60 LIMITED JOINT RANGE OF MOTION: Primary | ICD-10-CM

## 2022-04-22 DIAGNOSIS — Z74.09 IMPAIRED FUNCTIONAL MOBILITY, BALANCE, GAIT, AND ENDURANCE: ICD-10-CM

## 2022-04-22 PROCEDURE — 97110 THERAPEUTIC EXERCISES: CPT | Mod: PN,CQ

## 2022-04-26 ENCOUNTER — CLINICAL SUPPORT (OUTPATIENT)
Dept: REHABILITATION | Facility: HOSPITAL | Age: 32
End: 2022-04-26
Payer: MEDICAID

## 2022-04-26 DIAGNOSIS — M25.60 LIMITED JOINT RANGE OF MOTION: Primary | ICD-10-CM

## 2022-04-26 DIAGNOSIS — R53.1 WEAKNESS: ICD-10-CM

## 2022-04-26 DIAGNOSIS — Z74.09 IMPAIRED FUNCTIONAL MOBILITY, BALANCE, GAIT, AND ENDURANCE: ICD-10-CM

## 2022-04-26 PROCEDURE — 97110 THERAPEUTIC EXERCISES: CPT | Mod: PN | Performed by: PHYSICAL THERAPIST

## 2022-04-26 NOTE — PROGRESS NOTES
"  Physical Therapy Daily Treatment Note     Name: Jose A [facility]  Clinic Number: [id number]    Therapy Diagnosis:   Encounter Diagnoses   Name Primary?    Limited joint range of motion Yes    Weakness     Impaired functional mobility, balance, gait, and endurance      Physician: Toribio Allan MD    Visit Date: 4/26/2022  Physician Orders: PT Eval and Treat   Medical Diagnosis from Referral: Rupture of right achilles tendon   Evaluation Date: 3/28/2022  Authorization Period Expiration: 05/23/22  Plan of Care Expiration: 05/23/22  Progress Note Due: 04/28/22  Visit # / Visits authorized: 7/ 16   FOTO: 1/ 3   PTA Visit #: 1/5    Time In: 1130 AM  Time Out: 1216 PM   Total Billable Time: 46 minutes    Precautions: Standard and Weightbearing (10 weeks plus 5 days)     Subjective     Pt reports: His foot is getting better and better. I have been stretching a lot     He was compliant with home exercise program.  Response to previous treatment: Good tolerance to removal of wedge   Functional change: Improved walking ability     Pain: 0/10  Location: right ankle    Objective     ROM: DF: 0 PF: 45     Jose A received therapeutic exercises to develop strength, ROM and flexibility for 35 minutes including:    Upright bike 6 min   Seated heel raises 3 x 15 30#    Standing heel raises at stairs from neutral 3 x 15 - partially single leg  Decline heel raises on wedge (level: 2) 3 x 15     Ball squats 3 x 10   Shuttle B 4 bands 2 min  Shuttle R/L 3 bands 1min ea   Shuttle heel raises 4 bands 3 x 15    Shuttle heel raise R 1 band 3 x 15   Heel raise with isometric hold 5x 10 sec     Gastroc stretch to neutral with strap 4 x 30"  4 way ankle 3 x 15 GTB - NP     Jose A received the following manual therapy techniques: Joint mobilizations were applied to the: right ankle for 00 minutes, including:    Posterior glides of talus to improve dorsiflexion, grades III/IV    Jose A participated in neuromuscular re-education activities " to improve: Kinesthetic Sense and Proprioception for 5 minutes. The following activities were included:    Rockerboard PF/DF, IN/EV, CW/CCW 1 min ea - NP   Tandem stance on air ex 1 min ea  SLS on air ex 2 x 1 min     Jose A participated in gait training to improve functional mobility and safety for 00 minutes, including:    Home Exercises Provided and Patient Education Provided     Education provided:   - HEP review  - Progression of weightbearing and weaning from boot     Written Home Exercises Provided: Patient instructed to cont prior HEP.  Exercises were reviewed and Jose A was able to demonstrate them prior to the end of the session.  Jose A demonstrated good  understanding of the education provided.     See EMR under Patient Instructions for exercises provided prior visit.    Assessment     Strength continues to improve. Gait mechanics greatly improved and he is walking without a boot without pain. He will benefit from continued strengthening.     Jose A Is progressing well towards his goals.   Pt prognosis is Excellent.     Pt will continue to benefit from skilled outpatient physical therapy to address the deficits listed in the problem list box on initial evaluation, provide pt/family education and to maximize pt's level of independence in the home and community environment.     Pt's spiritual, cultural and educational needs considered and pt agreeable to plan of care and goals.    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

## 2022-05-03 ENCOUNTER — TELEPHONE (OUTPATIENT)
Dept: REHABILITATION | Facility: HOSPITAL | Age: 32
End: 2022-05-03
Payer: MEDICAID

## 2022-05-05 ENCOUNTER — OFFICE VISIT (OUTPATIENT)
Dept: SPORTS MEDICINE | Facility: CLINIC | Age: 32
End: 2022-05-05
Payer: MEDICAID

## 2022-05-05 VITALS
BODY MASS INDEX: 22.79 KG/M2 | OXYGEN SATURATION: 68 % | DIASTOLIC BLOOD PRESSURE: 83 MMHG | WEIGHT: 162.81 LBS | HEART RATE: 62 BPM | HEIGHT: 71 IN | SYSTOLIC BLOOD PRESSURE: 124 MMHG

## 2022-05-05 DIAGNOSIS — Z98.890 S/P ACHILLES TENDON REPAIR: Primary | ICD-10-CM

## 2022-05-05 DIAGNOSIS — S86.011D RUPTURE OF RIGHT ACHILLES TENDON, SUBSEQUENT ENCOUNTER: ICD-10-CM

## 2022-05-05 PROCEDURE — 3074F PR MOST RECENT SYSTOLIC BLOOD PRESSURE < 130 MM HG: ICD-10-PCS | Mod: CPTII,,, | Performed by: STUDENT IN AN ORGANIZED HEALTH CARE EDUCATION/TRAINING PROGRAM

## 2022-05-05 PROCEDURE — 1160F RVW MEDS BY RX/DR IN RCRD: CPT | Mod: CPTII,,, | Performed by: STUDENT IN AN ORGANIZED HEALTH CARE EDUCATION/TRAINING PROGRAM

## 2022-05-05 PROCEDURE — 3079F PR MOST RECENT DIASTOLIC BLOOD PRESSURE 80-89 MM HG: ICD-10-PCS | Mod: CPTII,,, | Performed by: STUDENT IN AN ORGANIZED HEALTH CARE EDUCATION/TRAINING PROGRAM

## 2022-05-05 PROCEDURE — 99213 OFFICE O/P EST LOW 20 MIN: CPT | Mod: PBBFAC | Performed by: STUDENT IN AN ORGANIZED HEALTH CARE EDUCATION/TRAINING PROGRAM

## 2022-05-05 PROCEDURE — 3008F PR BODY MASS INDEX (BMI) DOCUMENTED: ICD-10-PCS | Mod: CPTII,,, | Performed by: STUDENT IN AN ORGANIZED HEALTH CARE EDUCATION/TRAINING PROGRAM

## 2022-05-05 PROCEDURE — 99024 PR POST-OP FOLLOW-UP VISIT: ICD-10-PCS | Mod: ,,, | Performed by: STUDENT IN AN ORGANIZED HEALTH CARE EDUCATION/TRAINING PROGRAM

## 2022-05-05 PROCEDURE — 1159F MED LIST DOCD IN RCRD: CPT | Mod: CPTII,,, | Performed by: STUDENT IN AN ORGANIZED HEALTH CARE EDUCATION/TRAINING PROGRAM

## 2022-05-05 PROCEDURE — 1159F PR MEDICATION LIST DOCUMENTED IN MEDICAL RECORD: ICD-10-PCS | Mod: CPTII,,, | Performed by: STUDENT IN AN ORGANIZED HEALTH CARE EDUCATION/TRAINING PROGRAM

## 2022-05-05 PROCEDURE — 3074F SYST BP LT 130 MM HG: CPT | Mod: CPTII,,, | Performed by: STUDENT IN AN ORGANIZED HEALTH CARE EDUCATION/TRAINING PROGRAM

## 2022-05-05 PROCEDURE — 1160F PR REVIEW ALL MEDS BY PRESCRIBER/CLIN PHARMACIST DOCUMENTED: ICD-10-PCS | Mod: CPTII,,, | Performed by: STUDENT IN AN ORGANIZED HEALTH CARE EDUCATION/TRAINING PROGRAM

## 2022-05-05 PROCEDURE — 99999 PR PBB SHADOW E&M-EST. PATIENT-LVL III: ICD-10-PCS | Mod: PBBFAC,,, | Performed by: STUDENT IN AN ORGANIZED HEALTH CARE EDUCATION/TRAINING PROGRAM

## 2022-05-05 PROCEDURE — 3008F BODY MASS INDEX DOCD: CPT | Mod: CPTII,,, | Performed by: STUDENT IN AN ORGANIZED HEALTH CARE EDUCATION/TRAINING PROGRAM

## 2022-05-05 PROCEDURE — 99024 POSTOP FOLLOW-UP VISIT: CPT | Mod: ,,, | Performed by: STUDENT IN AN ORGANIZED HEALTH CARE EDUCATION/TRAINING PROGRAM

## 2022-05-05 PROCEDURE — 3079F DIAST BP 80-89 MM HG: CPT | Mod: CPTII,,, | Performed by: STUDENT IN AN ORGANIZED HEALTH CARE EDUCATION/TRAINING PROGRAM

## 2022-05-05 PROCEDURE — 99999 PR PBB SHADOW E&M-EST. PATIENT-LVL III: CPT | Mod: PBBFAC,,, | Performed by: STUDENT IN AN ORGANIZED HEALTH CARE EDUCATION/TRAINING PROGRAM

## 2022-05-05 NOTE — PROGRESS NOTES
Subjective:          Chief Complaint: Jose A Barber is a 31 y.o. male who had concerns including Post-op Evaluation of the Right Ankle (12 week Achilles).    HPI     Jose A Barber is a 31 y.o. male presents 12 weeks status post below.  He is doing very well.  He has no pain.  He is walking with normal footwear without issue.  He says he does feel his calf is somewhat weak, but overall this is progressing.  He is in physical therapy and making progress.    PROCEDURE PERFORMED with Toribio Allan on 02/09/2022:   1. Right Achilles tendon repair    Review of Systems   Constitutional: Negative.   HENT: Negative.    Eyes: Negative.    Cardiovascular: Negative.    Respiratory: Negative.    Endocrine: Negative.    Hematologic/Lymphatic: Negative.    Skin: Negative.    Musculoskeletal: Positive for muscle weakness. Negative for arthritis, falls, joint pain, joint swelling, muscle cramps and stiffness.   Gastrointestinal: Negative.    Genitourinary: Negative.    Neurological: Negative.    Psychiatric/Behavioral: Negative.    Allergic/Immunologic: Negative.        Pain Related Questions  Over the past 3 days, what was your average pain during activity? (I.e. running, jogging, walking, climbing stairs, getting dressed, ect.): 0  Over the past 3 days, what was your highest pain level?: 0  Over the past 3 days, what was your lowest pain level? : 0    Other  How many nights a week are you awakened by your affected body part?: 0  Was the patient's HEIGHT measured or patient reported?: Patient Reported  Was the patient's WEIGHT measured or patient reported?: Measured      Objective:        General: Jose A is well-developed, well-nourished, appears stated age, in no acute distress, alert and oriented to time, place and person.     General    Nursing note and vitals reviewed.  Constitutional: He is oriented to person, place, and time. He appears well-developed and well-nourished. No distress.   HENT:   Head: Normocephalic and  atraumatic.   Nose: Nose normal.   Eyes: EOM are normal.   Cardiovascular: Intact distal pulses.    Pulmonary/Chest: Effort normal. No respiratory distress.   Neurological: He is alert and oriented to person, place, and time.   Psychiatric: He has a normal mood and affect. His behavior is normal. Judgment and thought content normal.         Right Ankle/Foot Exam     Inspection   Scars: present  Deformity: absent  Erythema: absent  Bruising: Ankle - absent Foot - absent  Effusion: Ankle - absent Foot - absent  Atrophy: Ankle - absent Foot - absent    Other   Sensation: normal    Comments:  Incision sites healing well, without signs of erythema, infection, or wound dehiscence    Passive plantar flexion with Mata test    5-/5 strength to plantar flexion        Vascular Exam     Right Pulses  Dorsalis Pedis:      2+  Posterior Tibial:      2+                    Assessment:     Jose A Barber is a 31 y.o. male 12 weeks status post above and doing very well.   Encounter Diagnoses   Name Primary?    S/P Achilles tendon repair Yes    Rupture of right Achilles tendon, subsequent encounter           Plan:       He is doing very well.  He can continue with normal footwear.  Continue physical therapy.  He is okay to return to work.  He will return to clinic in 2-3 months for repeat evaluation.    All of their questions were answered.  They will call the clinic with any questions or concerns in the interim.    Should the patient's symptoms worsen, persist, or fail to improve they should return for reevaluation and I would be happy to see them back anytime.        Toribio Allan M.D.     Please be aware that this note has been generated with the assistance of [employer] voice-to-text.  Please excuse any spelling or grammatical errors.    Thank you for choosing Dr. Toribio Allan for your sports medicine care. It is our goal to provide you with exceptional care that will help keep you healthy, active, and get you back in the  game.     If you felt that you received exemplary care today, please consider leaving feedback for Dr. Allan on [employer] at [email or web address].    Please do not hesitate to reach out to us via email, phone, or [employer] with any questions, concerns, or feedback.

## 2022-05-27 NOTE — PROGRESS NOTES
Report callled to [facility], awaiting transport Subjective:          Chief Complaint: Jose A Barber is a 31 y.o. male who had no chief complaint listed for this encounter.    HPI     Patient is here s/p Right Achilles tendon repair for 2 week post-op appointment. He reports 0/10 pain and is not taking anything for pain medication.  He denies any nausea, vomiting, fever, chills, shortness of breath, or calf pain. He has not started physical therapy yet.  Patient states he has been compliant with not weight-bearing.  Lower leg splint of the right leg in place    PROCEDURE PERFORMED with Toribio Allan on 02/09/2022:   1. Right Achilles tendon repair    Review of Systems   Constitutional: Negative.   HENT: Negative.    Eyes: Negative.    Cardiovascular: Negative.    Respiratory: Negative.    Endocrine: Negative.    Hematologic/Lymphatic: Negative.    Skin: Negative.    Neurological: Negative.    Psychiatric/Behavioral: Negative.    Allergic/Immunologic: Negative.                    Objective:        General: Jose A is well-developed, well-nourished, appears stated age, in no acute distress, alert and oriented to time, place and person.     General    Constitutional: He is oriented to person, place, and time. He appears well-developed and well-nourished. No distress.   Cardiovascular: Normal rate and regular rhythm.    Neurological: He is alert and oriented to person, place, and time.   Psychiatric: He has a normal mood and affect. His behavior is normal. Thought content normal.         Right Ankle/Foot Exam     Inspection   Scars: present  Deformity: absent  Erythema: absent  Bruising: Ankle - absent Foot - absent  Effusion: Ankle - absent Foot - absent  Atrophy: Ankle - absent Foot - absent    Other   Sensation: normal    Comments:  Incision sites healing well, without signs of erythema, infection, or wound dehiscence          Vascular Exam     Right Pulses  Dorsalis Pedis:      2+  Posterior Tibial:      2+                    Assessment:       Encounter Diagnoses    Name Primary?    Rupture of right Achilles tendon, subsequent encounter Yes    Post-operative pain           Plan:       1. Splint was removed and patient was transitioned to a lower leg cast performed by Alicia JAIME.  Patient was reminded to continue not putting any weight on his right leg.    2. Suture tags removed and Steri-Strips applied.     3. Continue daily ASA and Celebrex    4. RTC to see Toribio Allan MD in 4 weeks for 6 week post-op evaluation      All of the patient's questions were answered. Patient was advised to call the clinic or contact me through the patient portal for any questions or concerns.                       Patient questionnaires may have been collected.

## (undated) DEVICE — BANDAGE MATRIX HK LOOP 6IN 5YD

## (undated) DEVICE — BNDG COFLEX FOAM LF2 ST 6X5YD

## (undated) DEVICE — UNDERGLOVES BIOGEL PI SIZE 8.5

## (undated) DEVICE — DRAPE STERI INSTRUMENT 1018

## (undated) DEVICE — SUT ETHILON 3-0 PS2 18 BLK

## (undated) DEVICE — DRAPE STERI U-SHAPED 47X51IN

## (undated) DEVICE — BNDG COFLEX FOAM LF2 ST 4X5YD

## (undated) DEVICE — Device

## (undated) DEVICE — SEE MEDLINE ITEM 157166

## (undated) DEVICE — TOWEL OR DISP STRL BLUE 4/PK

## (undated) DEVICE — SUT FIBERWIRE 2-0 18

## (undated) DEVICE — SUT VICRYL PLUS 0 CT1 18IN

## (undated) DEVICE — PILLOW HEAD REST

## (undated) DEVICE — GAUZE SPONGE 4X4 12PLY

## (undated) DEVICE — GLOVE PROTEXIS LTX  8.5

## (undated) DEVICE — PAD CAST SPECIALIST STRL 4

## (undated) DEVICE — GOWN SMARTSLEEVE XXL/XLONG

## (undated) DEVICE — TRAY MINOR ORTHO

## (undated) DEVICE — DRAPE SURG W/TWL 17 5/8X23

## (undated) DEVICE — DRAPE PLASTIC U 60X72

## (undated) DEVICE — PADDING WYTEX UNDRCST 6INX4YD

## (undated) DEVICE — SUT VICRYL PLUS 3-0 SH 18IN

## (undated) DEVICE — TOURNIQUET SB QC DP 34X4IN

## (undated) DEVICE — SEE MEDLINE ITEM 146298

## (undated) DEVICE — SEE MEDLINE ITEM 157150

## (undated) DEVICE — SPLINT PLASTER FAST SET 5X30IN

## (undated) DEVICE — SUT BLU BR 2 TAPERD NDL 1/2

## (undated) DEVICE — SEE MEDLINE ITEM 157131

## (undated) DEVICE — DRESSING XEROFORM FOIL PK 1X8